# Patient Record
Sex: FEMALE | Race: WHITE | NOT HISPANIC OR LATINO | URBAN - METROPOLITAN AREA
[De-identification: names, ages, dates, MRNs, and addresses within clinical notes are randomized per-mention and may not be internally consistent; named-entity substitution may affect disease eponyms.]

---

## 2023-04-19 PROBLEM — E03.9 HYPOTHYROIDISM: Status: ACTIVE | Noted: 2023-04-19

## 2023-09-12 DIAGNOSIS — E78.5 HYPERLIPIDEMIA, UNSPECIFIED: ICD-10-CM

## 2023-09-12 RX ORDER — ROSUVASTATIN CALCIUM 40 MG/1
40 TABLET, COATED ORAL DAILY
Qty: 90 TABLET | Refills: 1 | Status: SHIPPED | OUTPATIENT
Start: 2023-09-12 | End: 2023-11-10 | Stop reason: SDUPTHER

## 2023-09-18 ENCOUNTER — OFFICE VISIT (OUTPATIENT)
Dept: PRIMARY CARE | Facility: CLINIC | Age: 63
End: 2023-09-18
Payer: COMMERCIAL

## 2023-09-18 VITALS
HEART RATE: 70 BPM | WEIGHT: 184 LBS | BODY MASS INDEX: 30.66 KG/M2 | TEMPERATURE: 98.6 F | HEIGHT: 65 IN | SYSTOLIC BLOOD PRESSURE: 122 MMHG | DIASTOLIC BLOOD PRESSURE: 68 MMHG | OXYGEN SATURATION: 96 %

## 2023-09-18 DIAGNOSIS — Z00.00 HEALTHCARE MAINTENANCE: ICD-10-CM

## 2023-09-18 DIAGNOSIS — M54.50 CHRONIC LEFT-SIDED LOW BACK PAIN WITHOUT SCIATICA: Primary | ICD-10-CM

## 2023-09-18 DIAGNOSIS — G89.29 CHRONIC LEFT-SIDED LOW BACK PAIN WITHOUT SCIATICA: Primary | ICD-10-CM

## 2023-09-18 PROCEDURE — 99213 OFFICE O/P EST LOW 20 MIN: CPT | Performed by: INTERNAL MEDICINE

## 2023-09-18 PROCEDURE — 90471 IMMUNIZATION ADMIN: CPT | Performed by: INTERNAL MEDICINE

## 2023-09-18 PROCEDURE — 90682 RIV4 VACC RECOMBINANT DNA IM: CPT | Performed by: INTERNAL MEDICINE

## 2023-09-18 NOTE — PROGRESS NOTES
"64 yo female here for the following    Back Pain     PHYSICAL October 14, 2022      Assessment/ plan:    low back pain:  xray, tylenol  Patient defers robaxin and physical therapy.     Other medical issues see below     PAD bilateral ICA stenosis: patient to take baby aspirin, follow up with dr mathur      HLD: stable, continue statin to 40mg crestor      near syncope or \"light handedness\": on going light headed. getting a CTA neck and abdomen soon. her Holter is wnl. gave order for echo 1/28/21 was wnl, will repeat her blood work. follow up with dr mathur  carotid ultrasound originally showed   right ICA 40-59%  left ICA 60-79 %      HOWEVER she then had CTA neck which showed NO stenosis of the left ICA and mild stenosis of the right      Continue on aspirin and statin      hypothyroid: no symptoms at this time.      hx melanoma in her left leg in the past seeing dermatology every 6 months      PATIENT says that she needs to get 3D Mammography as she has dense breast tissue     she is following with dr davies for colonoscopy on May 20, 2022     EKG is wnl 2022     mammogram 10/19/2022      following with GYN at [facility] Dr Senior     recommend shingles vaccine      colonoscopy screening June 2022 with follow up in 10 years.      Chief Complaint     Back Pain     History of Present Illness  female with hypertension, hypothyroidism comes for a        Lower Back Pain: Intermittently for 2 years but has worsened over the past few months and has become more consistent. Left sided dull/achey lower back pain radiating to buttock and groin.  Pain improves with movement throughout the day but worse if she goes to bend over/ pick something up. Some relief with tylenol in the evening. No truama or accident. No falls. Feels worsening with sitting upright.     Other medical issues     near syncope or \"lightheaded\": she has to still follow up with dr mathur . she is using aspirin     PAD: patient has a carotid ultras sound with " 40-59% on the CASSIE and 60-79 % left ICA, however, the CTA of neck in 2021 january that showed no stenosis of the left ICA and mild stenosis of the right side. she is following with dr mathur. she is on aspirin and statin therapy      hypothyroid: patient denies any hypo or hypothyroid symptoms. patient denies any palpitations, diarrhea or constipation     HLD: Patient denies any muscle aches and is tolerating statin therapy     social: patient denies any smoking, drug or alcohol abuse     Family history is significant for premature coronary disease. Father had bypass surgery in his 50s.       surgical hx: removal of melanoma     patient does go to gyn     denies illegal drugs and smoking history   occasional alcohol use     patient works as a paper manager.          Review of Systems  Constitutional: not feeling tired and  no headache  Cardiovascular: no exertional chest pain, no palpitations, no lower extremity edema and no intermittent leg claudication.   Lungs: Denies shortness of breath, exertional dyspnea, wheezing  Gastrointestinal: no change in bowel habits, no diarrhea, no nausea, no vomiting and no abdominal pain. Denies Melena, brbpr or dark stool  Musculoskeletal: no myalgias, no muscle weakness and no limb swelling. See hpi  Neurological: no headaches, no seizures, no numbness, no lateralizing deficits and no fainting.   Psychiatric: no depression and no anxiety.          Active Problems  Problems    · Allergic rhinitis due to pollen (477.0) (J30.1)   · Cellulitis of foot, left (682.7) (L03.116)   · Cough (786.2) (R05.9)   · Cracked skin (709.8) (R23.4)   · Encounter for immunization (V03.89) (Z23)   · Encounter for screening colonoscopy (V76.51) (Z12.11)   · Hospital discharge follow-up (V67.59) (Z09)   · Hyperlipidemia (272.4) (E78.5)   · Hypothyroidism (244.9) (E03.9)   · Added by Problem List Migration; 2013-12-13   · Lymphedema of left lower extremity (457.1) (I89.0)   · Malignant melanoma (172.9)  (C43.9)   · Near syncope (780.2) (R55)   · PAD (peripheral artery disease) (443.9) (I73.9)   · Pressure in chest (786.59) (R07.89)   · Skin lesion (709.9) (L98.9)   · Right scapular area. 1.5x1.5 cm slightly raised red irregularly bordered   · Vitamin D deficiency (268.9) (E55.9)     Past Medical History  Problems    · History of H/O colonoscopy (V45.89) (Z98.890)   01/15/10 dr choudhury     Social History  Problems    · Never smoker     Allergies  Medication    · No Known Drug Allergies   Recorded By: Mariya Lai; 7/2/2014 10:20:50 AM     Current Meds     Medication Name Instruction   Aspirin 81 MG Oral Tablet Delayed Release     Levothyroxine Sodium 100 MCG Oral Tablet TAKE 1 TABLET DAILY.   Restasis 0.05 % Ophthalmic Emulsion INSTILL 1 DROP IN BOTH EYES EVERY 12 HOURS DAILY.   Rosuvastatin Calcium 40 MG Oral Tablet TAKE 1 TABLET DAILY.   Triamcinolone Acetonide 0.1 % External Cream APPLY  AND RUB  IN A THIN FILM TO AFFECTED AREAS TWICE DAILY.(AM AND PM).   Vitamin D3 50 MCG (2000 UT) Oral Capsule TAKE 1 CAPSULE Daily      Vitals  Vital Signs     Recorded: [id number] 08:43AM   Heart Rate 86   Systolic 126   Diastolic 80   Height 5 ft 5 in   Weight 176 lb    BMI Calculated 29.29 kg/m2   BSA Calculated 1.87   Tobacco Use b) No   Falls Screening (Age 18+) a) No falls within the last year   O2 Saturation 97      Physical Exam  General appearance: Alert and in no acute distress. speech is clear and coherent  HEENT: Sclera and conjunctiva white, EOMI,    Respiratory : No respiratory distress, normal respiratory rhythm and effort. Clear bilateral breath sounds. No wheezing or rhonchi.   Cardiovascular: heart rate regular, S1, S2. no murmurs. no Lower extremity edema  MSK: 5/5 strength upper and lower extremities without gait abnormalities. no loss of muscle mass   Neuro: 2-12 CN grossly intact. no slurred speech. no lateralizing deficit  Psychiatric Orientation: Oriented to person, place, and time. no depression, homicidal  or suicidal thoughts, normal affect

## 2023-10-16 ENCOUNTER — OFFICE VISIT (OUTPATIENT)
Dept: PRIMARY CARE | Facility: CLINIC | Age: 63
End: 2023-10-16
Payer: COMMERCIAL

## 2023-10-16 ENCOUNTER — LAB (OUTPATIENT)
Dept: LAB | Facility: LAB | Age: 63
End: 2023-10-16
Payer: COMMERCIAL

## 2023-10-16 VITALS
DIASTOLIC BLOOD PRESSURE: 70 MMHG | HEIGHT: 65 IN | WEIGHT: 184 LBS | SYSTOLIC BLOOD PRESSURE: 112 MMHG | TEMPERATURE: 97.9 F | OXYGEN SATURATION: 97 % | HEART RATE: 68 BPM | BODY MASS INDEX: 30.66 KG/M2

## 2023-10-16 DIAGNOSIS — E78.5 HYPERLIPIDEMIA, UNSPECIFIED HYPERLIPIDEMIA TYPE: ICD-10-CM

## 2023-10-16 DIAGNOSIS — Z00.00 ANNUAL PHYSICAL EXAM: ICD-10-CM

## 2023-10-16 DIAGNOSIS — E03.9 HYPOTHYROIDISM, UNSPECIFIED TYPE: ICD-10-CM

## 2023-10-16 DIAGNOSIS — E55.9 VITAMIN D DEFICIENCY: ICD-10-CM

## 2023-10-16 DIAGNOSIS — I73.9 PAD (PERIPHERAL ARTERY DISEASE) (CMS-HCC): ICD-10-CM

## 2023-10-16 DIAGNOSIS — Z00.00 ANNUAL PHYSICAL EXAM: Primary | ICD-10-CM

## 2023-10-16 LAB
25(OH)D3 SERPL-MCNC: 32 NG/ML (ref 30–100)
ALBUMIN SERPL BCP-MCNC: 4.6 G/DL (ref 3.4–5)
ALP SERPL-CCNC: 71 U/L (ref 33–136)
ALT SERPL W P-5'-P-CCNC: 33 U/L (ref 7–45)
ANION GAP SERPL CALC-SCNC: 14 MMOL/L (ref 10–20)
AST SERPL W P-5'-P-CCNC: 24 U/L (ref 9–39)
BILIRUB SERPL-MCNC: 0.5 MG/DL (ref 0–1.2)
BUN SERPL-MCNC: 17 MG/DL (ref 6–23)
CALCIUM SERPL-MCNC: 9.9 MG/DL (ref 8.6–10.6)
CHLORIDE SERPL-SCNC: 107 MMOL/L (ref 98–107)
CHOLEST SERPL-MCNC: 167 MG/DL (ref 0–199)
CHOLESTEROL/HDL RATIO: 2.2
CO2 SERPL-SCNC: 26 MMOL/L (ref 21–32)
CREAT SERPL-MCNC: 0.63 MG/DL (ref 0.5–1.05)
ERYTHROCYTE [DISTWIDTH] IN BLOOD BY AUTOMATED COUNT: 11.9 % (ref 11.5–14.5)
EST. AVERAGE GLUCOSE BLD GHB EST-MCNC: 114 MG/DL
GFR SERPL CREATININE-BSD FRML MDRD: >90 ML/MIN/1.73M*2
GLUCOSE SERPL-MCNC: 93 MG/DL (ref 74–99)
HBA1C MFR BLD: 5.6 %
HCT VFR BLD AUTO: 41.7 % (ref 36–46)
HDLC SERPL-MCNC: 74.5 MG/DL
HGB BLD-MCNC: 13.5 G/DL (ref 12–16)
LDLC SERPL CALC-MCNC: 77 MG/DL
MCH RBC QN AUTO: 30.8 PG (ref 26–34)
MCHC RBC AUTO-ENTMCNC: 32.4 G/DL (ref 32–36)
MCV RBC AUTO: 95 FL (ref 80–100)
NON HDL CHOLESTEROL: 93 MG/DL (ref 0–149)
NRBC BLD-RTO: 0 /100 WBCS (ref 0–0)
PLATELET # BLD AUTO: 322 X10*3/UL (ref 150–450)
PMV BLD AUTO: 10.9 FL (ref 7.5–11.5)
POTASSIUM SERPL-SCNC: 4.8 MMOL/L (ref 3.5–5.3)
PROT SERPL-MCNC: 6.8 G/DL (ref 6.4–8.2)
RBC # BLD AUTO: 4.39 X10*6/UL (ref 4–5.2)
SODIUM SERPL-SCNC: 142 MMOL/L (ref 136–145)
TRIGL SERPL-MCNC: 76 MG/DL (ref 0–149)
TSH SERPL-ACNC: 1.3 MIU/L (ref 0.44–3.98)
VLDL: 15 MG/DL (ref 0–40)
WBC # BLD AUTO: 7.7 X10*3/UL (ref 4.4–11.3)

## 2023-10-16 PROCEDURE — 80061 LIPID PANEL: CPT

## 2023-10-16 PROCEDURE — 1036F TOBACCO NON-USER: CPT | Performed by: INTERNAL MEDICINE

## 2023-10-16 PROCEDURE — 36415 COLL VENOUS BLD VENIPUNCTURE: CPT

## 2023-10-16 PROCEDURE — 80053 COMPREHEN METABOLIC PANEL: CPT

## 2023-10-16 PROCEDURE — 84443 ASSAY THYROID STIM HORMONE: CPT

## 2023-10-16 PROCEDURE — 83036 HEMOGLOBIN GLYCOSYLATED A1C: CPT

## 2023-10-16 PROCEDURE — 85027 COMPLETE CBC AUTOMATED: CPT

## 2023-10-16 PROCEDURE — 93000 ELECTROCARDIOGRAM COMPLETE: CPT | Performed by: INTERNAL MEDICINE

## 2023-10-16 PROCEDURE — 99396 PREV VISIT EST AGE 40-64: CPT | Performed by: INTERNAL MEDICINE

## 2023-10-16 PROCEDURE — 82306 VITAMIN D 25 HYDROXY: CPT

## 2023-10-16 NOTE — PROGRESS NOTES
"64 yo female here for the following    PHYSICAL October 16, 2023      Assessment/ plan:    low back pain:  xray, tylenol  Patient defers robaxin and physical therapy.     Other medical issues see below     PAD bilateral ICA stenosis: patient to take baby aspirin, follow up with dr mathur      HLD: stable, continue statin to 40mg crestor      near syncope or \"light handedness\": on going light headed. getting a CTA neck and abdomen soon. her Holter is wnl. gave order for echo 1/28/21 was wnl, will repeat her blood work. follow up with dr mathur  carotid ultrasound originally showed   right ICA 40-59%  left ICA 60-79 %      HOWEVER she then had CTA neck which showed NO stenosis of the left ICA and mild stenosis of the right      Continue on aspirin and statin     Will re-order carotid      hypothyroid: no symptoms at this time.      hx melanoma in her left leg in the past seeing dermatology every 6 months      PATIENT says that she needs to get 3D Mammography as she has dense breast tissue     she is following with dr davies for colonoscopy on May 20, 2022     EKG is wnl 2023     mammogram 10/19/2022      following with GYN at [facility] Dr Senior     recommend shingles vaccine      colonoscopy screening June 2022 with follow up in 10 years.      Chief Complaint     Back Pain     History of Present Illness  female with hypertension, hypothyroidism comes for a        Lower Back Pain: Intermittently for 2 years but has worsened over the past few months and has become more consistent. Left sided dull/achey lower back pain radiating to buttock and groin.  Pain improves with movement throughout the day but worse if she goes to bend over/ pick something up. Some relief with tylenol in the evening. No truama or accident. No falls. Feels worsening with sitting upright.     Other medical issues     near syncope or \"lightheaded\": she has to still follow up with dr mathur . she is using aspirin     PAD: patient has a carotid ultras " sound with 40-59% on the CASSIE and 60-79 % left ICA, however, the CTA of neck in 2021 january that showed no stenosis of the left ICA and mild stenosis of the right side. she is following with dr mathur. she is on aspirin and statin therapy      hypothyroid: patient denies any hypo or hypothyroid symptoms. patient denies any palpitations, diarrhea or constipation     HLD: Patient denies any muscle aches and is tolerating statin therapy     social: patient denies any smoking, drug or alcohol abuse     Family history is significant for premature coronary disease. Father had bypass surgery in his 50s.       surgical hx: removal of melanoma     patient does go to gyn     denies illegal drugs and smoking history   occasional alcohol use     patient works as a paper manager.          Review of Systems  Constitutional: not feeling tired and  no headache  Cardiovascular: no exertional chest pain, no palpitations, no lower extremity edema and no intermittent leg claudication.   Lungs: Denies shortness of breath, exertional dyspnea, wheezing  Gastrointestinal: no change in bowel habits, no diarrhea, no nausea, no vomiting and no abdominal pain. Denies Melena, brbpr or dark stool  Musculoskeletal: no myalgias, no muscle weakness and no limb swelling. See hpi  Neurological: no headaches, no seizures, no numbness, no lateralizing deficits and no fainting.   Psychiatric: no depression and no anxiety.          Active Problems  Problems    · Allergic rhinitis due to pollen (477.0) (J30.1)   · Cellulitis of foot, left (682.7) (L03.116)   · Cough (786.2) (R05.9)   · Cracked skin (709.8) (R23.4)   · Encounter for immunization (V03.89) (Z23)   · Encounter for screening colonoscopy (V76.51) (Z12.11)   · Hospital discharge follow-up (V67.59) (Z09)   · Hyperlipidemia (272.4) (E78.5)   · Hypothyroidism (244.9) (E03.9)   · Added by Problem List Migration; 2013-12-13   · Lymphedema of left lower extremity (457.1) (I89.0)   · Malignant  melanoma (172.9) (C43.9)   · Near syncope (780.2) (R55)   · PAD (peripheral artery disease) (443.9) (I73.9)   · Pressure in chest (786.59) (R07.89)   · Skin lesion (709.9) (L98.9)   · Right scapular area. 1.5x1.5 cm slightly raised red irregularly bordered   · Vitamin D deficiency (268.9) (E55.9)     Past Medical History  Problems    · History of H/O colonoscopy (V45.89) (Z98.890)   01/15/10 dr choudhury     Social History  Problems    · Never smoker     Allergies  Medication    · No Known Drug Allergies   Recorded By: Mariya Lai; 7/2/2014 10:20:50 AM     Current Meds     Medication Name Instruction   Aspirin 81 MG Oral Tablet Delayed Release     Levothyroxine Sodium 100 MCG Oral Tablet TAKE 1 TABLET DAILY.   Restasis 0.05 % Ophthalmic Emulsion INSTILL 1 DROP IN BOTH EYES EVERY 12 HOURS DAILY.   Rosuvastatin Calcium 40 MG Oral Tablet TAKE 1 TABLET DAILY.   Triamcinolone Acetonide 0.1 % External Cream APPLY  AND RUB  IN A THIN FILM TO AFFECTED AREAS TWICE DAILY.(AM AND PM).   Vitamin D3 50 MCG (2000 UT) Oral Capsule TAKE 1 CAPSULE Daily      Vitals  Vital Signs     Recorded: [id number] 08:43AM   Heart Rate 86   Systolic 126   Diastolic 80   Height 5 ft 5 in   Weight 176 lb    BMI Calculated 29.29 kg/m2   BSA Calculated 1.87   Tobacco Use b) No   Falls Screening (Age 18+) a) No falls within the last year   O2 Saturation 97      Physical Exam  General appearance: Alert and in no acute distress. speech is clear and coherent  HEENT: Sclera and conjunctiva white, EOMI, uvela midline, no mouth lesions. PERRLA,  nasal turbinates are not swollen without exudate. TM's Falk with cone of light, external ear canal with scant cerumen. No head trauma  Neck: no carotid bruits or thyromegaly. no lymphadenopathy   Respiratory : No respiratory distress, normal respiratory rhythm and effort. Clear bilateral breath sounds. No wheezing or rhonchi.   Cardiovascular: heart rate regular, S1, S2. no murmurs. no Lower extremity edema  Skin  inspection: Normal skin color and pigmentation, normal skin turgor and no visible rash, induration, or cellulitis  MSK: 5/5 strength upper and lower extremities without gait abnormalities. no loss of muscle mass   Neuro: 2-12 CN grossly intact.  no slurred speech. no lateralizing deficit  Psychiatric Orientation: Oriented to person, place, and time. no depression, homicidal or suicidal thoughts, normal affect  Abdomen: soft, none tender, none distended. no organomegaly

## 2023-11-09 ENCOUNTER — TELEPHONE (OUTPATIENT)
Dept: PRIMARY CARE | Facility: CLINIC | Age: 63
End: 2023-11-09
Payer: COMMERCIAL

## 2023-11-09 NOTE — TELEPHONE ENCOUNTER
I returned this pt.'s call for lab result.   I informed her all where WNL including her cholesterol  She had been making some changes in diet and exercise.   She wanted to know if provider wants to decrease her crestor  I told her I would ask her provider and if he does it will be forwarded to the pharmacy  She verbalized understanding

## 2023-11-10 DIAGNOSIS — E78.5 HYPERLIPIDEMIA, UNSPECIFIED: ICD-10-CM

## 2023-11-10 RX ORDER — ROSUVASTATIN CALCIUM 20 MG/1
20 TABLET, COATED ORAL DAILY
Qty: 90 TABLET | Refills: 3 | Status: SHIPPED | OUTPATIENT
Start: 2023-11-10

## 2023-11-14 ENCOUNTER — TELEPHONE (OUTPATIENT)
Dept: PRIMARY CARE | Facility: CLINIC | Age: 63
End: 2023-11-14
Payer: COMMERCIAL

## 2023-11-14 NOTE — TELEPHONE ENCOUNTER
----- Message from Mk Sapp DO sent at 11/10/2023  8:39 AM EST -----  I did call her in a reduced dose of crestor to 20mg daily from 40mg daily. She can cut her current pills in half if she would like  ----- Message -----  From: Aliya Braxton LPN  Sent: 11/9/2023   8:55 AM EST  To: Mk Sapp, DO    Do you want to decrease this pt.'s Crestor?    See below telephone conversation    Thanks!    I returned this pt.'s call for lab result.   I informed her all where WNL including her cholesterol  She had been making some changes in diet and exercise.   She wanted to know if provider wants to decrease her crestor  I told her I would ask her provider and if he does it will be forwarded to the pharmacy  She verbalized understanding

## 2024-03-16 DIAGNOSIS — E03.9 HYPOTHYROIDISM, UNSPECIFIED: ICD-10-CM

## 2024-03-19 RX ORDER — LEVOTHYROXINE SODIUM 100 UG/1
TABLET ORAL
Qty: 90 TABLET | Refills: 3 | Status: SHIPPED | OUTPATIENT
Start: 2024-03-19

## 2024-08-08 ENCOUNTER — OFFICE VISIT (OUTPATIENT)
Dept: PRIMARY CARE | Facility: CLINIC | Age: 64
End: 2024-08-08
Payer: COMMERCIAL

## 2024-08-08 VITALS
HEART RATE: 92 BPM | BODY MASS INDEX: 26.66 KG/M2 | SYSTOLIC BLOOD PRESSURE: 128 MMHG | HEIGHT: 65 IN | WEIGHT: 160 LBS | DIASTOLIC BLOOD PRESSURE: 62 MMHG | OXYGEN SATURATION: 94 %

## 2024-08-08 DIAGNOSIS — R63.4 WEIGHT LOSS, ABNORMAL: Primary | ICD-10-CM

## 2024-08-08 LAB
NON-UH HIE A/G RATIO: 1.5
NON-UH HIE ALB: 4.1 G/DL (ref 3.4–5)
NON-UH HIE ALK PHOS: 123 UNIT/L (ref 45–117)
NON-UH HIE BILIRUBIN, TOTAL: 0.6 MG/DL (ref 0.3–1.2)
NON-UH HIE BUN/CREAT RATIO: 13.3
NON-UH HIE BUN: 12 MG/DL (ref 9–23)
NON-UH HIE CALCIUM: 9.6 MG/DL (ref 8.7–10.4)
NON-UH HIE CALCULATED OSMOLALITY: 293 MOSM/KG (ref 275–295)
NON-UH HIE CHLORIDE: 101 MMOL/L (ref 98–107)
NON-UH HIE CO2, VENOUS: 20 MMOL/L (ref 20–31)
NON-UH HIE CREATININE: 0.9 MG/DL (ref 0.5–0.8)
NON-UH HIE GFR AA: >60
NON-UH HIE GLOBULIN: 2.8 G/DL
NON-UH HIE GLOMERULAR FILTRATION RATE: >60 ML/MIN/1.73M?
NON-UH HIE GLUCOSE: 484 MG/DL (ref 74–106)
NON-UH HIE GOT: 19 UNIT/L (ref 15–37)
NON-UH HIE GPT: 27 UNIT/L (ref 10–49)
NON-UH HIE HCT: 42.9 % (ref 36–46)
NON-UH HIE HGB A1C: 12.4 %
NON-UH HIE HGB: 14.5 G/DL (ref 12–16)
NON-UH HIE INSTR WBC ND: 9.7
NON-UH HIE K: 3.9 MMOL/L (ref 3.5–5.1)
NON-UH HIE MCH: 31.1 PG (ref 27–34)
NON-UH HIE MCHC: 33.8 G/DL (ref 32–37)
NON-UH HIE MCV: 92 FL (ref 80–100)
NON-UH HIE MPV: 10.1 FL (ref 7.4–10.4)
NON-UH HIE NA: 136 MMOL/L (ref 135–145)
NON-UH HIE PLATELET: 291 X10 (ref 150–450)
NON-UH HIE RBC: 4.66 X10 (ref 4.2–5.4)
NON-UH HIE RDW: 13.1 % (ref 11.5–14.5)
NON-UH HIE TOTAL PROTEIN: 6.9 G/DL (ref 5.7–8.2)
NON-UH HIE TSH: 2.42 UIU/ML (ref 0.55–4.78)
NON-UH HIE WBC: 9.7 X10 (ref 4.5–11)

## 2024-08-08 PROCEDURE — 99214 OFFICE O/P EST MOD 30 MIN: CPT | Performed by: INTERNAL MEDICINE

## 2024-08-08 PROCEDURE — 3008F BODY MASS INDEX DOCD: CPT | Performed by: INTERNAL MEDICINE

## 2024-08-08 PROCEDURE — 1036F TOBACCO NON-USER: CPT | Performed by: INTERNAL MEDICINE

## 2024-08-08 RX ORDER — CYCLOSPORINE 0.5 MG/ML
1 EMULSION OPHTHALMIC 2 TIMES DAILY
COMMUNITY

## 2024-08-08 RX ORDER — NAPROXEN SODIUM 220 MG/1
81 TABLET, FILM COATED ORAL
COMMUNITY

## 2024-08-08 NOTE — PROGRESS NOTES
"64 yo female here for the following    PHYSICAL October 16, 2023      Assessment/ plan:    abnormal weight loss 180s to 160s lbs in 1 month with temporal wasting on exam  CT chest abdomen and pelvis with IV contrast  Cbc cmp tsh   Mammogram October 2024 wnl  Colonoscopy up to date  Patient follows with GYN regularly for pelvic exams    Other medical issues see below     hx melanoma in her left leg in the past seeing dermatology every 6 months     PAD bilateral ICA stenosis: patient to take baby aspirin, follow up with dr mathur      HLD: stable, continue statin to 40mg crestor      near syncope or \"light handedness\": on going light headed. getting a CTA neck and abdomen soon. her Holter is wnl. gave order for echo 1/28/21 was wnl, will repeat her blood work. follow up with dr mathur  carotid ultrasound originally showed   right ICA 40-59%  left ICA 60-79 %      HOWEVER she then had CTA neck which showed NO stenosis of the left ICA and mild stenosis of the right      Continue on aspirin and statin         hypothyroid: no symptoms at this time.         PATIENT says that she needs to get 3D Mammography as she has dense breast tissue         EKG is wnl 2023         following with GYN at [facility] Dr Senior     recommend shingles vaccine      Dr Martin colonoscopy screening June 2022 with follow up in 10 years.      Chief Complaint     Back Pain     History of Present Illness  female with hypertension, hypothyroidism comes for a      Patient is losing weight. She is fatigued. She says she cannot get enough water and she is drinking water all the time. Bruising easily too.     Lower Back Pain: Intermittently for 2 years but has worsened over the past few months and has become more consistent. Left sided dull/achey lower back pain radiating to buttock and groin.  Pain improves with movement throughout the day but worse if she goes to bend over/ pick something up. Some relief with tylenol in the evening. No truama or " "accident. No falls. Feels worsening with sitting upright.     Other medical issues     near syncope or \"lightheaded\": she has to still follow up with dr mathur . she is using aspirin     PAD: patient has a carotid ultras sound with 40-59% on the CASSIE and 60-79 % left ICA, however, the CTA of neck in 2021 january that showed no stenosis of the left ICA and mild stenosis of the right side. she is following with dr mathur. she is on aspirin and statin therapy      hypothyroid: patient denies any hypo or hypothyroid symptoms. patient denies any palpitations, diarrhea or constipation     HLD: Patient denies any muscle aches and is tolerating statin therapy     social: patient denies any smoking, drug or alcohol abuse     Family history is significant for premature coronary disease. Father had bypass surgery in his 50s.       surgical hx: removal of melanoma     patient does go to gyn     denies illegal drugs and smoking history   occasional alcohol use     patient works as a paper manager.          Review of Systems  Constitutional: not feeling tired and  no headache  Cardiovascular: no exertional chest pain, no palpitations, no lower extremity edema and no intermittent leg claudication.   Lungs: Denies shortness of breath, exertional dyspnea, wheezing  Gastrointestinal: no change in bowel habits, no diarrhea, no nausea, no vomiting and no abdominal pain. Denies Melena, brbpr or dark stool  Musculoskeletal: no myalgias, no muscle weakness and no limb swelling. See hpi  Neurological: no headaches, no seizures, no numbness, no lateralizing deficits and no fainting.   Psychiatric: no depression and no anxiety.          Active Problems  Problems    · Allergic rhinitis due to pollen (477.0) (J30.1)   · Cellulitis of foot, left (682.7) (L03.116)   · Cough (786.2) (R05.9)   · Cracked skin (709.8) (R23.4)   · Encounter for immunization (V03.89) (Z23)   · Encounter for screening colonoscopy (V76.51) (Z12.11)   · Hospital " discharge follow-up (V67.59) (Z09)   · Hyperlipidemia (272.4) (E78.5)   · Hypothyroidism (244.9) (E03.9)   · Added by Problem List Migration; 2013-12-13   · Lymphedema of left lower extremity (457.1) (I89.0)   · Malignant melanoma (172.9) (C43.9)   · Near syncope (780.2) (R55)   · PAD (peripheral artery disease) (443.9) (I73.9)   · Pressure in chest (786.59) (R07.89)   · Skin lesion (709.9) (L98.9)   · Right scapular area. 1.5x1.5 cm slightly raised red irregularly bordered   · Vitamin D deficiency (268.9) (E55.9)     Past Medical History  Problems    · History of H/O colonoscopy (V45.89) (Z98.890)   01/15/10 dr choudhury     Social History  Problems    · Never smoker     Allergies  Medication    · No Known Drug Allergies   Recorded By: Mariya Lai; 7/2/2014 10:20:50 AM     Current Meds     Medication Name Instruction   Aspirin 81 MG Oral Tablet Delayed Release     Levothyroxine Sodium 100 MCG Oral Tablet TAKE 1 TABLET DAILY.   Restasis 0.05 % Ophthalmic Emulsion INSTILL 1 DROP IN BOTH EYES EVERY 12 HOURS DAILY.   Rosuvastatin Calcium 40 MG Oral Tablet TAKE 1 TABLET DAILY.   Triamcinolone Acetonide 0.1 % External Cream APPLY  AND RUB  IN A THIN FILM TO AFFECTED AREAS TWICE DAILY.(AM AND PM).   Vitamin D3 50 MCG (2000 UT) Oral Capsule TAKE 1 CAPSULE Daily      Vitals  Vital Signs     Recorded: [id number] 08:43AM   Heart Rate 86   Systolic 126   Diastolic 80   Height 5 ft 5 in   Weight 176 lb    BMI Calculated 29.29 kg/m2   BSA Calculated 1.87   Tobacco Use b) No   Falls Screening (Age 18+) a) No falls within the last year   O2 Saturation 97      Physical Exam  General appearance: Alert and in no acute distress. speech is clear and coherent  HEENT: Sclera and conjunctiva white, EOMI, uvela midline, no mouth lesions. PERRLA,  nasal turbinates are not swollen without exudate. TM's Falk with cone of light, external ear canal with scant cerumen. No head trauma. Temproal wasting noted.   Neck: no carotid bruits or  thyromegaly. no lymphadenopathy   Respiratory : No respiratory distress, normal respiratory rhythm and effort. Clear bilateral breath sounds. No wheezing or rhonchi.   Cardiovascular: heart rate regular, S1, S2. no murmurs. no Lower extremity edema  Skin inspection: Normal skin color and pigmentation, normal skin turgor and no visible rash, induration, or cellulitis  MSK: 5/5 strength upper and lower extremities without gait abnormalities. no loss of muscle mass   Neuro: 2-12 CN grossly intact.  no slurred speech. no lateralizing deficit  Psychiatric Orientation: Oriented to person, place, and time. no depression, homicidal or suicidal thoughts, normal affect  Abdomen: soft, none tender, none distended. no organomegaly

## 2024-08-09 ENCOUNTER — OFFICE VISIT (OUTPATIENT)
Dept: PRIMARY CARE | Facility: CLINIC | Age: 64
End: 2024-08-09
Payer: COMMERCIAL

## 2024-08-09 VITALS
HEART RATE: 74 BPM | DIASTOLIC BLOOD PRESSURE: 74 MMHG | HEIGHT: 65 IN | OXYGEN SATURATION: 95 % | BODY MASS INDEX: 26.12 KG/M2 | WEIGHT: 156.8 LBS | SYSTOLIC BLOOD PRESSURE: 133 MMHG

## 2024-08-09 DIAGNOSIS — E03.9 HYPOTHYROIDISM, UNSPECIFIED TYPE: ICD-10-CM

## 2024-08-09 DIAGNOSIS — E13.69 OTHER SPECIFIED DIABETES MELLITUS WITH OTHER SPECIFIED COMPLICATION, WITHOUT LONG-TERM CURRENT USE OF INSULIN (MULTI): Primary | ICD-10-CM

## 2024-08-09 DIAGNOSIS — E13.69 OTHER SPECIFIED DIABETES MELLITUS WITH OTHER SPECIFIED COMPLICATION, UNSPECIFIED WHETHER LONG TERM INSULIN USE (MULTI): ICD-10-CM

## 2024-08-09 DIAGNOSIS — I73.9 PAD (PERIPHERAL ARTERY DISEASE) (CMS-HCC): ICD-10-CM

## 2024-08-09 DIAGNOSIS — E78.5 HYPERLIPIDEMIA, UNSPECIFIED HYPERLIPIDEMIA TYPE: ICD-10-CM

## 2024-08-09 PROCEDURE — 3008F BODY MASS INDEX DOCD: CPT | Performed by: EMERGENCY MEDICINE

## 2024-08-09 PROCEDURE — 3078F DIAST BP <80 MM HG: CPT | Performed by: EMERGENCY MEDICINE

## 2024-08-09 PROCEDURE — 99214 OFFICE O/P EST MOD 30 MIN: CPT | Performed by: EMERGENCY MEDICINE

## 2024-08-09 PROCEDURE — 1036F TOBACCO NON-USER: CPT | Performed by: EMERGENCY MEDICINE

## 2024-08-09 PROCEDURE — 3075F SYST BP GE 130 - 139MM HG: CPT | Performed by: EMERGENCY MEDICINE

## 2024-08-09 RX ORDER — METFORMIN HYDROCHLORIDE 500 MG/1
500 TABLET ORAL
Qty: 180 TABLET | Refills: 1 | Status: SHIPPED | OUTPATIENT
Start: 2024-08-09 | End: 2025-02-05

## 2024-08-09 RX ORDER — DEXTROSE 4 G
TABLET,CHEWABLE ORAL
Qty: 3 EACH | Refills: 1 | Status: SHIPPED | OUTPATIENT
Start: 2024-08-09

## 2024-08-09 RX ORDER — LANCETS
EACH MISCELLANEOUS
Qty: 100 EACH | Refills: 3 | Status: SHIPPED | OUTPATIENT
Start: 2024-08-09

## 2024-08-09 RX ORDER — BLOOD SUGAR DIAGNOSTIC
STRIP MISCELLANEOUS
Qty: 100 EACH | Refills: 1 | Status: SHIPPED | OUTPATIENT
Start: 2024-08-09

## 2024-08-09 NOTE — PROGRESS NOTES
"64 yo female here for office visit    PHYSICAL October 16, 2023      Assessment/ plan:    Diabetes -  Patient has has a lack of appetite, abnormal weight loss, dry mouth, and excessive thirst and urination. Symptoms included light headedness and some bruising. She likely has diabetes as sugar was 484 and A1c was 12. Patient was counseled on the importance of maintaining a healthy diet and exercise. We will prescribe metformin and monitor, is situation does not improve we will increase the dose and update treatment plan. Patient will check glucose at home, we can consider continuous glucose monitor.      Other medical issues see below     hx melanoma in her left leg in the past seeing dermatology every 6 months     PAD bilateral ICA stenosis: patient to take baby aspirin, follow up with dr mathur      HLD: stable, continue statin to 40mg crestor      near syncope or \"light handedness\": on going light headed. getting a CTA neck and abdomen soon. her Holter is wnl. gave order for echo 1/28/21 was wnl, will repeat her blood work. follow up with dr mathur  carotid ultrasound originally showed   right ICA 40-59%  left ICA 60-79 %      HOWEVER she then had CTA neck which showed NO stenosis of the left ICA and mild stenosis of the right      Continue on aspirin and statin         hypothyroid: no symptoms at this time.       Preventative care-  PATIENT says that she needs to get 3D Mammography as she has dense breast tissue  EKG is wnl 2023  following with GYN at [facility] Dr Senior  recommend shingles vaccine   Dr Martin colonoscopy screening June 2022 with follow up in 10 years.     Schedule a follow up in september     Chief Complaint     Back Pain     History of Present Illness  female with hypertension, hypothyroidism comes for a          Lower Back Pain: Intermittently for 2 years but has worsened over the past few months and has become more consistent. Left sided dull/achey lower back pain radiating to buttock and " "groin.  Pain improves with movement throughout the day but worse if she goes to bend over/ pick something up. Some relief with tylenol in the evening. No truama or accident. No falls. Feels worsening with sitting upright.     Other medical issues     near syncope or \"lightheaded\": she has to still follow up with dr mathur . she is using aspirin     PAD: patient has a carotid ultras sound with 40-59% on the CASSIE and 60-79 % left ICA, however, the CTA of neck in 2021 january that showed no stenosis of the left ICA and mild stenosis of the right side. she is following with dr mathur. she is on aspirin and statin therapy      hypothyroid: patient denies any hypo or hypothyroid symptoms. patient denies any palpitations, diarrhea or constipation     HLD: Patient denies any muscle aches and is tolerating statin therapy     social: patient denies any smoking, drug or alcohol abuse     Family history is significant for premature coronary disease. Father had bypass surgery in his 50s.       surgical hx: removal of melanoma     patient does go to gyn     denies illegal drugs and smoking history   occasional alcohol use     patient works as a paper manager.          Review of Systems  Constitutional: not feeling tired and  no headache  Cardiovascular: no exertional chest pain, no palpitations, no lower extremity edema and no intermittent leg claudication.   Lungs: Denies shortness of breath, exertional dyspnea, wheezing  Gastrointestinal: no change in bowel habits, no diarrhea, no nausea, no vomiting and no abdominal pain. Denies Melena, brbpr or dark stool  Musculoskeletal: no myalgias, no muscle weakness and no limb swelling. See hpi  Neurological: no headaches, no seizures, no numbness, no lateralizing deficits and no fainting.   Psychiatric: no depression and no anxiety.          Active Problems  Problems    · Allergic rhinitis due to pollen (477.0) (J30.1)   · Cellulitis of foot, left (682.7) (L03.116)   · Cough (786.2) " (R05.9)   · Cracked skin (709.8) (R23.4)   · Encounter for immunization (V03.89) (Z23)   · Encounter for screening colonoscopy (V76.51) (Z12.11)   · Hospital discharge follow-up (V67.59) (Z09)   · Hyperlipidemia (272.4) (E78.5)   · Hypothyroidism (244.9) (E03.9)   · Added by Problem List Migration; 2013-12-13   · Lymphedema of left lower extremity (457.1) (I89.0)   · Malignant melanoma (172.9) (C43.9)   · Near syncope (780.2) (R55)   · PAD (peripheral artery disease) (443.9) (I73.9)   · Pressure in chest (786.59) (R07.89)   · Skin lesion (709.9) (L98.9)   · Right scapular area. 1.5x1.5 cm slightly raised red irregularly bordered   · Vitamin D deficiency (268.9) (E55.9)     Past Medical History  Problems    · History of H/O colonoscopy (V45.89) (Z98.890)   01/15/10 dr choudhury     Social History  Problems    · Never smoker     Allergies  Medication    · No Known Drug Allergies   Recorded By: Mariya Lai; 7/2/2014 10:20:50 AM     Current Meds     Medication Name Instruction   Aspirin 81 MG Oral Tablet Delayed Release     Levothyroxine Sodium 100 MCG Oral Tablet TAKE 1 TABLET DAILY.   Restasis 0.05 % Ophthalmic Emulsion INSTILL 1 DROP IN BOTH EYES EVERY 12 HOURS DAILY.   Rosuvastatin Calcium 40 MG Oral Tablet TAKE 1 TABLET DAILY.   Triamcinolone Acetonide 0.1 % External Cream APPLY  AND RUB  IN A THIN FILM TO AFFECTED AREAS TWICE DAILY.(AM AND PM).   Vitamin D3 50 MCG (2000 UT) Oral Capsule TAKE 1 CAPSULE Daily      Vitals  Vital Signs     Recorded: [id number] 08:43AM   Heart Rate 86   Systolic 126   Diastolic 80   Height 5 ft 5 in   Weight 176 lb    BMI Calculated 29.29 kg/m2   BSA Calculated 1.87   Tobacco Use b) No   Falls Screening (Age 18+) a) No falls within the last year   O2 Saturation 97      Physical Exam  General appearance: Alert and in no acute distress. speech is clear and coherent  HEENT: Sclera and conjunctiva white, EOMI, uvela midline, no mouth lesions. PERRLA,  nasal turbinates are not swollen without  exudate. TM's Grey with cone of light, external ear canal with scant cerumen. No head trauma. Temproal wasting noted.   Neck: no carotid bruits or thyromegaly. no lymphadenopathy   Respiratory : No respiratory distress, normal respiratory rhythm and effort. Clear bilateral breath sounds. No wheezing or rhonchi.   Cardiovascular: heart rate regular, S1, S2. no murmurs. no Lower extremity edema  Skin inspection: Normal skin color and pigmentation, normal skin turgor and no visible rash, induration, or cellulitis  MSK: 5/5 strength upper and lower extremities without gait abnormalities. no loss of muscle mass   Neuro: 2-12 CN grossly intact.  no slurred speech. no lateralizing deficit  Psychiatric Orientation: Oriented to person, place, and time. no depression, homicidal or suicidal thoughts, normal affect  Abdomen: soft, none tender, none distended. no organomegaly

## 2024-08-14 ENCOUNTER — APPOINTMENT (OUTPATIENT)
Dept: PRIMARY CARE | Facility: CLINIC | Age: 64
End: 2024-08-14
Payer: COMMERCIAL

## 2024-08-14 DIAGNOSIS — R63.4 WEIGHT LOSS, ABNORMAL: ICD-10-CM

## 2024-08-14 DIAGNOSIS — E11.9 TYPE 2 DIABETES MELLITUS WITHOUT COMPLICATION, WITHOUT LONG-TERM CURRENT USE OF INSULIN (MULTI): Primary | ICD-10-CM

## 2024-08-14 DIAGNOSIS — R74.8 ELEVATED ALKALINE PHOSPHATASE LEVEL: ICD-10-CM

## 2024-08-14 PROCEDURE — 99443 PR PHYS/QHP TELEPHONE EVALUATION 21-30 MIN: CPT | Performed by: INTERNAL MEDICINE

## 2024-08-14 RX ORDER — GLIPIZIDE 2.5 MG/1
2.5 TABLET, EXTENDED RELEASE ORAL DAILY
Qty: 30 TABLET | Refills: 11 | Status: SHIPPED | OUTPATIENT
Start: 2024-08-14 | End: 2025-08-14

## 2024-08-14 NOTE — PROGRESS NOTES
"62 yo female here for the following    Telephone appointment     PHYSICAL October 16, 2023      Assessment/ plan:   NEW DIABETES type 2:   She has a glucose monitor 200s in am and the 400s post prandial   A1C 12  Started on metformin 500mg bid   Glipizide ER 2.5mg daily   ARSEN ab     Elevated alkaline  elevated abnormal weight loss 180s to 160s lbs in 1 month with temporal wasting on exam  Left lower back   CT chest abdomen and pelvis with IV contrast  Cbc cmp tsh is wnl  Mammogram October 2024 wnl  Colonoscopy up to date  Patient follows with GYN regularly for pelvic exams    Other medical issues see below     hx melanoma in her left leg in the past seeing dermatology every 6 months     PAD bilateral ICA stenosis: patient to take baby aspirin, follow up with dr mathur      HLD: stable, continue statin to 40mg crestor      near syncope or \"light handedness\": on going light headed. getting a CTA neck and abdomen soon. her Holter is wnl. gave order for echo 1/28/21 was wnl, will repeat her blood work. follow up with dr mathur  carotid ultrasound originally showed   right ICA 40-59%  left ICA 60-79 %      HOWEVER she then had CTA neck which showed NO stenosis of the left ICA and mild stenosis of the right      Continue on aspirin and statin         hypothyroid: no symptoms at this time.         PATIENT says that she needs to get 3D Mammography as she has dense breast tissue         EKG is wnl 2023         following with GYN at [facility] Dr Senior     recommend shingles vaccine      Dr Martin colonoscopy screening June 2022 with follow up in 10 years.      Chief Complaint     Back Pain     History of Present Illness  female with hypertension, hypothyroidism comes for a      Patient is losing weight. She is fatigued. She says she cannot get enough water and she is drinking water all the time. Bruising easily too. Patients now found to have A1c 12. Her a1cs were always wnl.     Lower Back Pain: Intermittently for 2 years " "but has worsened over the past few months and has become more consistent. Left sided dull/achey lower back pain radiating to buttock and groin.  Pain improves with movement throughout the day but worse if she goes to bend over/ pick something up. Some relief with tylenol in the evening. No truama or accident. No falls. Feels worsening with sitting upright.     Other medical issues     near syncope or \"lightheaded\": she has to still follow up with dr mathur . she is using aspirin     PAD: patient has a carotid ultras sound with 40-59% on the CASSIE and 60-79 % left ICA, however, the CTA of neck in 2021 january that showed no stenosis of the left ICA and mild stenosis of the right side. she is following with dr mathur. she is on aspirin and statin therapy      hypothyroid: patient denies any hypo or hypothyroid symptoms. patient denies any palpitations, diarrhea or constipation     HLD: Patient denies any muscle aches and is tolerating statin therapy     social: patient denies any smoking, drug or alcohol abuse     Family history is significant for premature coronary disease. Father had bypass surgery in his 50s.       surgical hx: removal of melanoma     patient does go to gyn     denies illegal drugs and smoking history   occasional alcohol use     patient works as a paper manager.          Review of Systems           Active Problems  Problems    · Allergic rhinitis due to pollen (477.0) (J30.1)   · Cellulitis of foot, left (682.7) (L03.116)   · Cough (786.2) (R05.9)   · Cracked skin (709.8) (R23.4)   · Encounter for immunization (V03.89) (Z23)   · Encounter for screening colonoscopy (V76.51) (Z12.11)   · Hospital discharge follow-up (V67.59) (Z09)   · Hyperlipidemia (272.4) (E78.5)   · Hypothyroidism (244.9) (E03.9)   · Added by Problem List Migration; 2013-12-13   · Lymphedema of left lower extremity (457.1) (I89.0)   · Malignant melanoma (172.9) (C43.9)   · Near syncope (780.2) (R55)   · PAD (peripheral artery " disease) (443.9) (I73.9)   · Pressure in chest (786.59) (R07.89)   · Skin lesion (709.9) (L98.9)   · Right scapular area. 1.5x1.5 cm slightly raised red irregularly bordered   · Vitamin D deficiency (268.9) (E55.9)     Past Medical History  Problems    · History of H/O colonoscopy (V45.89) (Z98.890)   01/15/10 dr choudhury     Social History  Problems    · Never smoker     Allergies  Medication    · No Known Drug Allergies   Recorded By: Mariya Lai; 7/2/2014 10:20:50 AM     Current Meds     Medication Name Instruction   Aspirin 81 MG Oral Tablet Delayed Release     Levothyroxine Sodium 100 MCG Oral Tablet TAKE 1 TABLET DAILY.   Restasis 0.05 % Ophthalmic Emulsion INSTILL 1 DROP IN BOTH EYES EVERY 12 HOURS DAILY.   Rosuvastatin Calcium 40 MG Oral Tablet TAKE 1 TABLET DAILY.   Triamcinolone Acetonide 0.1 % External Cream APPLY  AND RUB  IN A THIN FILM TO AFFECTED AREAS TWICE DAILY.(AM AND PM).   Vitamin D3 50 MCG (2000 UT) Oral Capsule TAKE 1 CAPSULE Daily      Vitals  Vital Signs     Recorded: [id number] 08:43AM   Heart Rate 86   Systolic 126   Diastolic 80   Height 5 ft 5 in   Weight 176 lb    BMI Calculated 29.29 kg/m2   BSA Calculated 1.87   Tobacco Use b) No   Falls Screening (Age 18+) a) No falls within the last year   O2 Saturation 97      Physical Exam

## 2024-08-19 ENCOUNTER — TELEPHONE (OUTPATIENT)
Dept: PRIMARY CARE | Facility: CLINIC | Age: 64
End: 2024-08-19
Payer: COMMERCIAL

## 2024-08-19 NOTE — TELEPHONE ENCOUNTER
Pt states they had a CT scan and glucose test done recently, and they were advised to schedule a virtual once they had those results. When should I schedule her?

## 2024-08-22 NOTE — TELEPHONE ENCOUNTER
----- Message from Mk Sapp sent at 8/21/2024 11:36 AM EDT -----  No acute findings on ct  There are lung nodules that we will follow up in 1 year on   You have prominent ovarian veins identified left greater then right, recommend referral to GYN to discuss the finding.

## 2024-08-23 ENCOUNTER — TELEMEDICINE (OUTPATIENT)
Dept: PRIMARY CARE | Facility: CLINIC | Age: 64
End: 2024-08-23
Payer: COMMERCIAL

## 2024-08-23 ENCOUNTER — APPOINTMENT (OUTPATIENT)
Dept: PRIMARY CARE | Facility: CLINIC | Age: 64
End: 2024-08-23
Payer: COMMERCIAL

## 2024-08-23 DIAGNOSIS — R91.1 LUNG NODULE: Primary | ICD-10-CM

## 2024-08-23 DIAGNOSIS — E13.69 OTHER SPECIFIED DIABETES MELLITUS WITH OTHER SPECIFIED COMPLICATION, UNSPECIFIED WHETHER LONG TERM INSULIN USE (MULTI): ICD-10-CM

## 2024-08-23 DIAGNOSIS — E11.9 TYPE 2 DIABETES MELLITUS WITHOUT COMPLICATION, WITHOUT LONG-TERM CURRENT USE OF INSULIN (MULTI): ICD-10-CM

## 2024-08-23 PROCEDURE — 99442 PR PHYS/QHP TELEPHONE EVALUATION 11-20 MIN: CPT | Performed by: INTERNAL MEDICINE

## 2024-08-23 RX ORDER — METFORMIN HYDROCHLORIDE 1000 MG/1
1000 TABLET ORAL
Qty: 180 TABLET | Refills: 3 | Status: SHIPPED | OUTPATIENT
Start: 2024-08-23 | End: 2025-08-18

## 2024-08-23 RX ORDER — GLIPIZIDE 5 MG/1
5 TABLET, FILM COATED, EXTENDED RELEASE ORAL DAILY
Qty: 90 TABLET | Refills: 3 | Status: SHIPPED | OUTPATIENT
Start: 2024-08-23 | End: 2025-08-23

## 2024-08-23 NOTE — PROGRESS NOTES
"64 yo female here for the following    Telephone appointment for diabetes     PHYSICAL October 16, 2023      Assessment/ plan:   NEW DIABETES type 2:   She has a glucose monitor 200s in am and the 400s post prandial   A1C 12 (8/8/24)  Blood sugar log reviewed shows sugars in the  morning sugars 200s in the -400s  Started on metformin 500mg bid 8/8/24 --> 1000mg bid 8/23/24  Glipizide ER 2.5mg daily  8/8/24 --> Glipizide 5mg ER 8/23/24  ARSEN ab , please get blood work done at your earliest convenience     Follow up in 2 weeks     Lung nodule follow up August 2025     Elevated alkaline  elevated abnormal weight loss 180s to 160s lbs in 1 month with temporal wasting on exam  Left lower back     8/2024 CT chest abdomen and pelvis with IV contrast No acute findings on ct  There are lung nodules that we will follow up in 1 year on  You have prominent ovarian veins identified left greater then right, recommend referral to GYN to discuss the finding.     Mammogram October 2024 wnl  Colonoscopy up to date  Patient follows with GYN regularly for pelvic exams    Other medical issues see below     hx melanoma in her left leg in the past seeing dermatology every 6 months     PAD bilateral ICA stenosis: patient to take baby aspirin, follow up with dr mathur      HLD: stable, continue statin to 40mg crestor      near syncope or \"light handedness\": on going light headed. getting a CTA neck and abdomen soon. her Holter is wnl. gave order for echo 1/28/21 was wnl, will repeat her blood work. follow up with dr mathur  carotid ultrasound originally showed   right ICA 40-59%  left ICA 60-79 %      HOWEVER she then had CTA neck which showed NO stenosis of the left ICA and mild stenosis of the right      Continue on aspirin and statin         hypothyroid: no symptoms at this time.         PATIENT says that she needs to get 3D Mammography as she has dense breast tissue         EKG is wnl 2023         following with GYN at [facility] Dr" "Parrish     recommend shingles vaccine      Dr Martin colonoscopy screening June 2022 with follow up in 10 years.      Chief Complaint     Back Pain     History of Present Illness  female with hypertension, hypothyroidism comes for a      Patient is losing weight. She is fatigued. She says she cannot get enough water and she is drinking water all the time. Bruising easily too. Patients now found to have A1c 12. Her a1cs were always wnl. Patient has given me 10 days of her blood sugars with morning fasting sugars in the 200s and lung and dinner mostly in the 300-400 range.     Lower Back Pain: Intermittently for 2 years but has worsened over the past few months and has become more consistent. Left sided dull/achey lower back pain radiating to buttock and groin.  Pain improves with movement throughout the day but worse if she goes to bend over/ pick something up. Some relief with tylenol in the evening. No truama or accident. No falls. Feels worsening with sitting upright.     Other medical issues     near syncope or \"lightheaded\": she has to still follow up with dr mathur . she is using aspirin     PAD: patient has a carotid ultras sound with 40-59% on the CASSIE and 60-79 % left ICA, however, the CTA of neck in 2021 january that showed no stenosis of the left ICA and mild stenosis of the right side. she is following with dr mathur. she is on aspirin and statin therapy      hypothyroid: patient denies any hypo or hypothyroid symptoms. patient denies any palpitations, diarrhea or constipation     HLD: Patient denies any muscle aches and is tolerating statin therapy     social: patient denies any smoking, drug or alcohol abuse     Family history is significant for premature coronary disease. Father had bypass surgery in his 50s.       surgical hx: removal of melanoma     patient does go to gyn     denies illegal drugs and smoking history   occasional alcohol use     patient works as a paper manager.          Review of " Systems           Active Problems  Problems    · Allergic rhinitis due to pollen (477.0) (J30.1)   · Cellulitis of foot, left (682.7) (L03.116)   · Cough (786.2) (R05.9)   · Cracked skin (709.8) (R23.4)   · Encounter for immunization (V03.89) (Z23)   · Encounter for screening colonoscopy (V76.51) (Z12.11)   · Hospital discharge follow-up (V67.59) (Z09)   · Hyperlipidemia (272.4) (E78.5)   · Hypothyroidism (244.9) (E03.9)   · Added by Problem List Migration; 2013-12-13   · Lymphedema of left lower extremity (457.1) (I89.0)   · Malignant melanoma (172.9) (C43.9)   · Near syncope (780.2) (R55)   · PAD (peripheral artery disease) (443.9) (I73.9)   · Pressure in chest (786.59) (R07.89)   · Skin lesion (709.9) (L98.9)   · Right scapular area. 1.5x1.5 cm slightly raised red irregularly bordered   · Vitamin D deficiency (268.9) (E55.9)     Past Medical History  Problems    · History of H/O colonoscopy (V45.89) (Z98.890)   01/15/10 dr choudhury     Social History  Problems    · Never smoker     Allergies  Medication    · No Known Drug Allergies   Recorded By: Mariya Lai; 7/2/2014 10:20:50 AM     Current Meds     Medication Name Instruction   Aspirin 81 MG Oral Tablet Delayed Release     Levothyroxine Sodium 100 MCG Oral Tablet TAKE 1 TABLET DAILY.   Restasis 0.05 % Ophthalmic Emulsion INSTILL 1 DROP IN BOTH EYES EVERY 12 HOURS DAILY.   Rosuvastatin Calcium 40 MG Oral Tablet TAKE 1 TABLET DAILY.   Triamcinolone Acetonide 0.1 % External Cream APPLY  AND RUB  IN A THIN FILM TO AFFECTED AREAS TWICE DAILY.(AM AND PM).   Vitamin D3 50 MCG (2000 UT) Oral Capsule TAKE 1 CAPSULE Daily      Vitals  Vital Signs     Recorded: 14Oct2022 08:43AM   Heart Rate 86   Systolic 126   Diastolic 80   Height 5 ft 5 in   Weight 176 lb    BMI Calculated 29.29 kg/m2   BSA Calculated 1.87   Tobacco Use b) No   Falls Screening (Age 18+) a) No falls within the last year   O2 Saturation 97      Physical Exam

## 2024-08-26 ENCOUNTER — LAB (OUTPATIENT)
Dept: LAB | Facility: LAB | Age: 64
End: 2024-08-26
Payer: COMMERCIAL

## 2024-08-26 DIAGNOSIS — E11.9 TYPE 2 DIABETES MELLITUS WITHOUT COMPLICATION, WITHOUT LONG-TERM CURRENT USE OF INSULIN (MULTI): ICD-10-CM

## 2024-08-26 DIAGNOSIS — R74.8 ELEVATED ALKALINE PHOSPHATASE LEVEL: ICD-10-CM

## 2024-08-26 LAB — GGT SERPL-CCNC: 18 U/L (ref 5–55)

## 2024-08-26 PROCEDURE — 36415 COLL VENOUS BLD VENIPUNCTURE: CPT

## 2024-08-26 PROCEDURE — 83519 RIA NONANTIBODY: CPT

## 2024-08-26 PROCEDURE — 82977 ASSAY OF GGT: CPT

## 2024-08-29 LAB — GAD65 AB SER IA-ACNC: >250 IU/ML (ref 0–5)

## 2024-08-30 ENCOUNTER — TELEPHONE (OUTPATIENT)
Dept: PRIMARY CARE | Facility: CLINIC | Age: 64
End: 2024-08-30
Payer: COMMERCIAL

## 2024-09-03 ENCOUNTER — PATIENT MESSAGE (OUTPATIENT)
Dept: PRIMARY CARE | Facility: CLINIC | Age: 64
End: 2024-09-03
Payer: COMMERCIAL

## 2024-09-04 DIAGNOSIS — E11.9 TYPE 2 DIABETES MELLITUS WITHOUT COMPLICATION, WITHOUT LONG-TERM CURRENT USE OF INSULIN (MULTI): Primary | ICD-10-CM

## 2024-09-04 NOTE — TELEPHONE ENCOUNTER
Lvmtcb   Patient has a large autoimmune component to her diabetes. Recommend making sure she is still making insulin. Please get next set of blood work.   How are her blood sugars on average in the mornings now?

## 2024-09-05 ENCOUNTER — TELEPHONE (OUTPATIENT)
Dept: PRIMARY CARE | Facility: CLINIC | Age: 64
End: 2024-09-05
Payer: COMMERCIAL

## 2024-09-09 ENCOUNTER — LAB (OUTPATIENT)
Dept: LAB | Facility: LAB | Age: 64
End: 2024-09-09
Payer: COMMERCIAL

## 2024-09-09 DIAGNOSIS — E11.9 TYPE 2 DIABETES MELLITUS WITHOUT COMPLICATION, WITHOUT LONG-TERM CURRENT USE OF INSULIN (MULTI): Primary | ICD-10-CM

## 2024-09-09 DIAGNOSIS — E10.9 TYPE 1 DIABETES MELLITUS WITHOUT COMPLICATION (MULTI): ICD-10-CM

## 2024-09-09 DIAGNOSIS — E11.9 TYPE 2 DIABETES MELLITUS WITHOUT COMPLICATION, WITHOUT LONG-TERM CURRENT USE OF INSULIN (MULTI): ICD-10-CM

## 2024-09-09 LAB — C PEPTIDE SERPL-MCNC: 1.1 NG/ML (ref 0.7–3.9)

## 2024-09-09 PROCEDURE — 84681 ASSAY OF C-PEPTIDE: CPT

## 2024-09-09 PROCEDURE — 36415 COLL VENOUS BLD VENIPUNCTURE: CPT

## 2024-09-09 RX ORDER — INSULIN GLARGINE 100 [IU]/ML
10 INJECTION, SOLUTION SUBCUTANEOUS NIGHTLY
Qty: 3 ML | Refills: 3 | Status: SHIPPED | OUTPATIENT
Start: 2024-09-09 | End: 2025-09-09

## 2024-09-09 NOTE — PROGRESS NOTES
Discussed with dr villalta. Who will see the patient next week. Will initiate insulin . Attempted to call patient. Left message.

## 2024-09-09 NOTE — TELEPHONE ENCOUNTER
Spoke with patient. She wanted you to know that she has a 20 day average of 238 for her sugar. She did state that recently it has been getting better. Most recent was 176

## 2024-09-10 DIAGNOSIS — E11.9 TYPE 2 DIABETES MELLITUS WITHOUT COMPLICATION, WITHOUT LONG-TERM CURRENT USE OF INSULIN (MULTI): Primary | ICD-10-CM

## 2024-09-10 RX ORDER — PEN NEEDLE, DIABETIC 30 GX3/16"
1 NEEDLE, DISPOSABLE MISCELLANEOUS NIGHTLY
Qty: 90 EACH | Refills: 3 | Status: SHIPPED | OUTPATIENT
Start: 2024-09-10 | End: 2024-09-13 | Stop reason: SDUPTHER

## 2024-09-11 ENCOUNTER — APPOINTMENT (OUTPATIENT)
Dept: PRIMARY CARE | Facility: CLINIC | Age: 64
End: 2024-09-11
Payer: COMMERCIAL

## 2024-09-13 ENCOUNTER — OFFICE VISIT (OUTPATIENT)
Dept: PRIMARY CARE | Facility: CLINIC | Age: 64
End: 2024-09-13
Payer: COMMERCIAL

## 2024-09-13 VITALS
BODY MASS INDEX: 25.33 KG/M2 | OXYGEN SATURATION: 95 % | WEIGHT: 152 LBS | HEART RATE: 80 BPM | DIASTOLIC BLOOD PRESSURE: 68 MMHG | HEIGHT: 65 IN | SYSTOLIC BLOOD PRESSURE: 128 MMHG

## 2024-09-13 DIAGNOSIS — E10.69 TYPE 1 DIABETES MELLITUS WITH OTHER SPECIFIED COMPLICATION (MULTI): ICD-10-CM

## 2024-09-13 DIAGNOSIS — B37.9 YEAST INFECTION: Primary | ICD-10-CM

## 2024-09-13 DIAGNOSIS — E11.9 TYPE 2 DIABETES MELLITUS WITHOUT COMPLICATION, WITHOUT LONG-TERM CURRENT USE OF INSULIN (MULTI): ICD-10-CM

## 2024-09-13 DIAGNOSIS — F41.9 ANXIETY: ICD-10-CM

## 2024-09-13 LAB
NON-UH HIE APPEARANCE, U: CLEAR
NON-UH HIE BILIRUBIN, U: NEGATIVE
NON-UH HIE BLOOD, U: NEGATIVE
NON-UH HIE BUN/CREAT RATIO: 16.7
NON-UH HIE BUN: 10 MG/DL (ref 9–23)
NON-UH HIE CALCIUM: 10 MG/DL (ref 8.7–10.4)
NON-UH HIE CALCULATED OSMOLALITY: 282 MOSM/KG (ref 275–295)
NON-UH HIE CHLORIDE: 104 MMOL/L (ref 98–107)
NON-UH HIE CO2, VENOUS: 25 MMOL/L (ref 20–31)
NON-UH HIE COLOR, U: COLORLESS
NON-UH HIE CREATININE: 0.6 MG/DL (ref 0.5–0.8)
NON-UH HIE GFR AA: >60
NON-UH HIE GLOMERULAR FILTRATION RATE: >60 ML/MIN/1.73M?
NON-UH HIE GLUCOSE QUAL, U: NEGATIVE
NON-UH HIE GLUCOSE: 162 MG/DL (ref 74–106)
NON-UH HIE K: 3.8 MMOL/L (ref 3.5–5.1)
NON-UH HIE KETONES, U: NEGATIVE
NON-UH HIE LEUKOCYTE ESTERASE, U: NEGATIVE
NON-UH HIE NA: 140 MMOL/L (ref 135–145)
NON-UH HIE NITRITE, U: NEGATIVE
NON-UH HIE PH, U: 5.5 (ref 4.5–8)
NON-UH HIE PROTEIN, U: NEGATIVE
NON-UH HIE SPECIFIC GRAVITY, U: 1 (ref 1–1.03)
NON-UH HIE U MICRO: NORMAL
NON-UH HIE UROBILINOGEN QUAL, U: NORMAL

## 2024-09-13 PROCEDURE — 3008F BODY MASS INDEX DOCD: CPT | Performed by: INTERNAL MEDICINE

## 2024-09-13 PROCEDURE — 3074F SYST BP LT 130 MM HG: CPT | Performed by: INTERNAL MEDICINE

## 2024-09-13 PROCEDURE — 99214 OFFICE O/P EST MOD 30 MIN: CPT | Performed by: INTERNAL MEDICINE

## 2024-09-13 PROCEDURE — 1036F TOBACCO NON-USER: CPT | Performed by: INTERNAL MEDICINE

## 2024-09-13 PROCEDURE — 3078F DIAST BP <80 MM HG: CPT | Performed by: INTERNAL MEDICINE

## 2024-09-13 RX ORDER — INSULIN LISPRO 100 [IU]/ML
5 INJECTION, SOLUTION INTRAVENOUS; SUBCUTANEOUS
Qty: 15 ML | Refills: 3 | Status: SHIPPED | OUTPATIENT
Start: 2024-09-13

## 2024-09-13 RX ORDER — BLOOD-GLUCOSE SENSOR
EACH MISCELLANEOUS
Qty: 6 EACH | Refills: 3 | Status: SHIPPED | OUTPATIENT
Start: 2024-09-13

## 2024-09-13 RX ORDER — PEN NEEDLE, DIABETIC 30 GX3/16"
1 NEEDLE, DISPOSABLE MISCELLANEOUS NIGHTLY
Qty: 90 EACH | Refills: 3 | Status: SHIPPED | OUTPATIENT
Start: 2024-09-13

## 2024-09-13 RX ORDER — FLUCONAZOLE 150 MG/1
TABLET ORAL
Qty: 2 TABLET | Refills: 0 | Status: SHIPPED | OUTPATIENT
Start: 2024-09-13

## 2024-09-13 RX ORDER — BLOOD-GLUCOSE,RECEIVER,CONT
EACH MISCELLANEOUS
Qty: 1 EACH | Refills: 0 | Status: SHIPPED | OUTPATIENT
Start: 2024-09-13

## 2024-09-13 NOTE — LETTER
September 13, 2024     Patient: Naomi Willis   YOB: 1960   Date of Visit: 9/13/2024       To Whom It May Concern:    Naomi Willis was seen in my clinic on 9/13/2024 at 8:30 am. Please excuse Naomi for her absence from work on this day to make the appointment. And Please excuse naomi from work from September 16th until September 29th, with her return to work date being September 30th.     If you have any questions or concerns, please don't hesitate to call.         Sincerely,         Mk Sapp, DO        CC: Mk Sapp, DO

## 2024-09-13 NOTE — PROGRESS NOTES
"62 yo female here for the following    Chronic disease follow up    PHYSICAL October 16, 2023      Assessment/ plan:   Originally thought to be type 2 DM but with such high ARSEN and low c-peptide, low BMI she is likely acting more like a Type 1 DM:     A1C 5.6 --> 12.4 (8/8/24)  Blood sugar log reviewed shows sugars in the  morning sugars 200s in the -400s  Started on metformin 500mg bid 8/8/24 --> 1000mg bid 8/23/24  Glipizide ER 2.5mg daily  8/8/24 --> Glipizide 5mg ER 8/23/24--> stop glipizide 9/13/24  Lantus 10 units  Start humalog SSI    Blood sugar          0 units  151-200    2 units  201-250    4 units  251-300    6 units  301-350    8 units  351-400     10 units  Greater than 400 12 units call physician    Free style zoran two ordered    Bmp, ua ketones, abg     Anxiety: patient is feeling overwhelmed with her current circumstance    patient needs letter for employer to be off September 16-29 to go back to work on the 30 of September    Follow up in 3 months    Lung nodule follow up August 2025     Elevated alkaline  elevated abnormal weight loss 180s to 160s lbs in 1 month with temporal wasting on exam  Left lower back     8/2024 CT chest abdomen and pelvis with IV contrast No acute findings on ct  There are lung nodules that we will follow up in 1 year on  You have prominent ovarian veins identified left greater then right, recommend referral to GYN to discuss the finding.     Mammogram October 2024 wnl  Colonoscopy up to date  Patient follows with GYN regularly for pelvic exams    Other medical issues see below     hx melanoma in her left leg in the past seeing dermatology every 6 months     PAD bilateral ICA stenosis: patient to take baby aspirin, follow up with dr mathur      HLSEGUNDO: stable, continue statin to 40mg crestor      near syncope or \"light handedness\": on going light headed. getting a CTA neck and abdomen soon. her Holter is wnl. gave order for echo 1/28/21 was wnl, will repeat her " "blood work. follow up with dr mathur  carotid ultrasound originally showed   right ICA 40-59%  left ICA 60-79 %      HOWEVER she then had CTA neck which showed NO stenosis of the left ICA and mild stenosis of the right      Continue on aspirin and statin         hypothyroid: no symptoms at this time.         PATIENT says that she needs to get 3D Mammography as she has dense breast tissue         EKG is wnl 2023         following with GYN at [facility] Dr Senior     recommend shingles vaccine      Dr Martin colonoscopy screening June 2022 with follow up in 10 years.          History of Present Illness  female with hypertension, hypothyroidism comes for a      Patient is losing weight. She is fatigued. She says she cannot get enough water and she is drinking water all the time. Bruising easily too. Patients now found to have A1c 12. Her a1cs were always wnl. Patient has given me 10 days of her blood sugars with morning fasting sugars in the 200s and lung and dinner mostly in the 300-400 range. ARSEN Ab very elevated with little C-peptide production. Patient was started on insulin a few days ago. Her blood sugars are improving     Lower Back Pain: Intermittently for 2 years but has worsened over the past few months and has become more consistent. Left sided dull/achey lower back pain radiating to buttock and groin.  Pain improves with movement throughout the day but worse if she goes to bend over/ pick something up. Some relief with tylenol in the evening. No truama or accident. No falls. Feels worsening with sitting upright.     Other medical issues     near syncope or \"lightheaded\": she has to still follow up with dr mathur . she is using aspirin     PAD: patient has a carotid ultras sound with 40-59% on the CASSIE and 60-79 % left ICA, however, the CTA of neck in 2021 january that showed no stenosis of the left ICA and mild stenosis of the right side. she is following with dr mathur. she is on aspirin and statin therapy "      hypothyroid: patient denies any hypo or hypothyroid symptoms. patient denies any palpitations, diarrhea or constipation     HLD: Patient denies any muscle aches and is tolerating statin therapy     social: patient denies any smoking, drug or alcohol abuse     Family history is significant for premature coronary disease. Father had bypass surgery in his 50s.       surgical hx: removal of melanoma     patient does go to gyn     denies illegal drugs and smoking history   occasional alcohol use     patient works as a paper manager.          Review of Systems      Constitutional: not feeling tired and no fever, chills or sweats   Cardiovascular: no exertional chest pain, no palpitations,    Lungs: Denies shortness of breath, exertional dyspnea, wheezing  Gastrointestinal: no change in bowel habits, no diarrhea, no nausea,    Musculoskeletal: no myalgias, no muscle weakness and no limb swelling.   Skin: no rashes, no change in skin color and pigmentation and no skin lumps.   Neurological: no headaches, no seizures, no numbness, no lateralizing deficits and no fainting.   Psychiatric: no depression and no anxiety.   Urine: denies polyuria, hematuria, dysuria   Endocrine: no cold intolerance, no heat intolerance       Active Problems  Problems    · Allergic rhinitis due to pollen (477.0) (J30.1)   · Cellulitis of foot, left (682.7) (L03.116)   · Cough (786.2) (R05.9)   · Cracked skin (709.8) (R23.4)   · Encounter for immunization (V03.89) (Z23)   · Encounter for screening colonoscopy (V76.51) (Z12.11)   · Hospital discharge follow-up (V67.59) (Z09)   · Hyperlipidemia (272.4) (E78.5)   · Hypothyroidism (244.9) (E03.9)   · Added by Problem List Migration; 2013-12-13   · Lymphedema of left lower extremity (457.1) (I89.0)   · Malignant melanoma (172.9) (C43.9)   · Near syncope (780.2) (R55)   · PAD (peripheral artery disease) (443.9) (I73.9)   · Pressure in chest (786.59) (R07.89)   · Skin lesion (709.9) (L98.9)   · Right  scapular area. 1.5x1.5 cm slightly raised red irregularly bordered   · Vitamin D deficiency (268.9) (E55.9)     Past Medical History  Problems    · History of H/O colonoscopy (V45.89) (Z98.890)   01/15/10 dr choudhury     Social History  Problems    · Never smoker     Allergies  Medication    · No Known Drug Allergies   Recorded By: Mariya Lai; 7/2/2014 10:20:50 AM     Current Meds     Medication Name Instruction   Aspirin 81 MG Oral Tablet Delayed Release     Levothyroxine Sodium 100 MCG Oral Tablet TAKE 1 TABLET DAILY.   Restasis 0.05 % Ophthalmic Emulsion INSTILL 1 DROP IN BOTH EYES EVERY 12 HOURS DAILY.   Rosuvastatin Calcium 40 MG Oral Tablet TAKE 1 TABLET DAILY.   Triamcinolone Acetonide 0.1 % External Cream APPLY  AND RUB  IN A THIN FILM TO AFFECTED AREAS TWICE DAILY.(AM AND PM).   Vitamin D3 50 MCG (2000 UT) Oral Capsule TAKE 1 CAPSULE Daily      Vitals  Vital Signs     Recorded: [id number] 08:43AM   Heart Rate 86   Systolic 126   Diastolic 80   Height 5 ft 5 in   Weight 176 lb    BMI Calculated 29.29 kg/m2   BSA Calculated 1.87   Tobacco Use b) No   Falls Screening (Age 18+) a) No falls within the last year   O2 Saturation 97      Physical Exam     General appearance: Alert and in no acute distress. speech is clear and coherent  HEENT: Sclera and conjunctiva white, EOMI,     Respiratory : No respiratory distress, normal respiratory rhythm and effort. Clear bilateral breath sounds. No wheezing or rhonchi.   Cardiovascular: heart rate regular, S1, S2. no murmurs. no Lower extremity edema  Skin inspection: Normal skin color and pigmentation, normal skin turgor and no visible rash, induration, or cellulitis  MSK: 5/5 strength upper and lower extremities without gait abnormalities. no loss of muscle mass   Neuro: 2-12 CN grossly intact.  no slurred speech. no lateralizing deficit  Psychiatric Orientation: Oriented to person, place, and time. no depression, homicidal or suicidal thoughts, normal affect

## 2024-09-17 ENCOUNTER — HOSPITAL ENCOUNTER (OUTPATIENT)
Dept: RESPIRATORY THERAPY | Facility: HOSPITAL | Age: 64
Discharge: HOME | End: 2024-09-17
Payer: COMMERCIAL

## 2024-09-17 DIAGNOSIS — E11.9 TYPE 2 DIABETES MELLITUS WITHOUT COMPLICATION, WITHOUT LONG-TERM CURRENT USE OF INSULIN (MULTI): ICD-10-CM

## 2024-09-17 DIAGNOSIS — E11.9 TYPE 2 DIABETES MELLITUS WITHOUT COMPLICATION, WITHOUT LONG-TERM CURRENT USE OF INSULIN (MULTI): Primary | ICD-10-CM

## 2024-09-17 LAB
ARTERIAL PATENCY WRIST A: ABNORMAL
BASE EXCESS BLDA CALC-SCNC: 2.9 MMOL/L (ref -2–3)
BODY TEMPERATURE: 37 DEGREES CELSIUS
COHGB MFR BLDA: 1.6 %
DO-HGB MFR BLDA: 0.7 % (ref 0–5)
HCO3 BLDA-SCNC: 25.9 MMOL/L (ref 22–26)
HGB BLDA-MCNC: 14 G/DL (ref 12–16)
INHALED O2 CONCENTRATION: 21 %
METHGB MFR BLDA: 1 % (ref 0–1.5)
NON-UH HIE MISC SENDOUT: NORMAL
OXYHGB MFR BLDA: 96.7 % (ref 94–98)
PCO2 BLDA: 34 MM HG (ref 38–42)
PH BLDA: 7.49 PH (ref 7.38–7.42)
PO2 BLDA: 101 MM HG (ref 85–95)
SAO2 % BLDA: 99 % (ref 94–100)
SITE OF ARTERIAL PUNCTURE: ABNORMAL

## 2024-09-17 PROCEDURE — 36600 WITHDRAWAL OF ARTERIAL BLOOD: CPT

## 2024-09-17 PROCEDURE — 82375 ASSAY CARBOXYHB QUANT: CPT

## 2024-09-17 RX ORDER — FLASH GLUCOSE SCANNING READER
EACH MISCELLANEOUS
Qty: 1 EACH | Refills: 0 | Status: SHIPPED | OUTPATIENT
Start: 2024-09-17

## 2024-09-17 RX ORDER — FLASH GLUCOSE SENSOR
KIT MISCELLANEOUS
Qty: 1 EACH | Refills: 0 | Status: SHIPPED | OUTPATIENT
Start: 2024-09-17

## 2024-10-15 ENCOUNTER — TELEPHONE (OUTPATIENT)
Dept: PRIMARY CARE | Facility: CLINIC | Age: 64
End: 2024-10-15
Payer: COMMERCIAL

## 2024-10-15 NOTE — TELEPHONE ENCOUNTER
NEEDS TO KNOW IF SHE SHOULD FAST FOR APPT TOMORROW. NEW DIABETIC AN NUMBERS SEEM TO GO DOWN IN AM.

## 2024-10-16 ENCOUNTER — APPOINTMENT (OUTPATIENT)
Dept: PRIMARY CARE | Facility: CLINIC | Age: 64
End: 2024-10-16
Payer: COMMERCIAL

## 2024-10-16 VITALS
SYSTOLIC BLOOD PRESSURE: 118 MMHG | OXYGEN SATURATION: 98 % | DIASTOLIC BLOOD PRESSURE: 64 MMHG | BODY MASS INDEX: 24.83 KG/M2 | WEIGHT: 149 LBS | HEIGHT: 65 IN | HEART RATE: 75 BPM

## 2024-10-16 DIAGNOSIS — E13.69 OTHER SPECIFIED DIABETES MELLITUS WITH OTHER SPECIFIED COMPLICATION, UNSPECIFIED WHETHER LONG TERM INSULIN USE (MULTI): Primary | ICD-10-CM

## 2024-10-16 DIAGNOSIS — Z23 INFLUENZA VACCINATION ADMINISTERED AT CURRENT VISIT: ICD-10-CM

## 2024-10-16 DIAGNOSIS — C43.9 MALIGNANT MELANOMA, UNSPECIFIED SITE (MULTI): ICD-10-CM

## 2024-10-16 DIAGNOSIS — I73.9 PAD (PERIPHERAL ARTERY DISEASE) (CMS-HCC): ICD-10-CM

## 2024-10-16 LAB — POC HEMOGLOBIN A1C: 8.9 % (ref 4.2–6.5)

## 2024-10-16 PROCEDURE — 3078F DIAST BP <80 MM HG: CPT | Performed by: INTERNAL MEDICINE

## 2024-10-16 PROCEDURE — 3074F SYST BP LT 130 MM HG: CPT | Performed by: INTERNAL MEDICINE

## 2024-10-16 PROCEDURE — 90673 RIV3 VACCINE NO PRESERV IM: CPT | Performed by: INTERNAL MEDICINE

## 2024-10-16 PROCEDURE — 3008F BODY MASS INDEX DOCD: CPT | Performed by: INTERNAL MEDICINE

## 2024-10-16 PROCEDURE — 1036F TOBACCO NON-USER: CPT | Performed by: INTERNAL MEDICINE

## 2024-10-16 PROCEDURE — 90471 IMMUNIZATION ADMIN: CPT | Performed by: INTERNAL MEDICINE

## 2024-10-16 PROCEDURE — 99214 OFFICE O/P EST MOD 30 MIN: CPT | Performed by: INTERNAL MEDICINE

## 2024-10-16 PROCEDURE — 83036 HEMOGLOBIN GLYCOSYLATED A1C: CPT | Performed by: INTERNAL MEDICINE

## 2024-10-16 RX ORDER — INSULIN GLARGINE-YFGN 100 [IU]/ML
10 INJECTION, SOLUTION SUBCUTANEOUS NIGHTLY
COMMUNITY
Start: 2024-09-18

## 2024-10-16 NOTE — PROGRESS NOTES
"64 yo female here for the following     DM follow up    PHYSICAL October 16, 2023      Assessment/ plan:   Originally thought to be type 2 DM but with such high ARSEN and low c-peptide, low BMI she is likely acting more like a Type 1 DM:     A1C 5.6 --> 12.4 (8/8/24) --> 8.9 (10/16/24)  Blood sugar log reviewed shows sugars in the  morning sugars <140s prior to lunch and dinner are 100s. Patient count carbs  Started on metformin 500mg bid 8/8/24 --> 1000mg bid 8/23/24  Glipizide ER 2.5mg daily  8/8/24 --> Glipizide 5mg ER 8/23/24--> stop glipizide 9/13/24  Lantus 10 units  Continue on humalog SSI    Blood sugar          0 units  151-200    2 units  201-250    4 units  251-300    6 units  301-350    8 units  351-400     10 units  Greater than 400 12 units call physician    Free style zoran two ordered    Bmp, ua ketones, abg     Anxiety: stable    Lung nodule follow up August 2025     Elevated alkaline  elevated abnormal weight loss 180s to 160s lbs in 1 month with temporal wasting on exam  Left lower back     8/2024 CT chest abdomen and pelvis with IV contrast No acute findings on ct  There are lung nodules that we will follow up in 1 year on  You have prominent ovarian veins identified left greater then right, recommend referral to GYN to discuss the finding.     Mammogram October 2024 wnl  Colonoscopy up to date  Patient follows with GYN regularly for pelvic exams    Other medical issues see below     hx melanoma in her left leg in the past seeing dermatology every 6 months     PAD bilateral ICA stenosis: patient to take baby aspirin, follow up with dr mathur      HLD: stable, continue statin to 40mg crestor      near syncope or \"light handedness\": on going light headed. getting a CTA neck and abdomen soon. her Holter is wnl. gave order for echo 1/28/21 was wnl, will repeat her blood work. follow up with dr mathur  carotid ultrasound originally showed   right ICA 40-59%  left ICA 60-79 %      HOWEVER she then " "had CTA neck which showed NO stenosis of the left ICA and mild stenosis of the right      Continue on aspirin and statin         hypothyroid: no symptoms at this time.         PATIENT says that she needs to get 3D Mammography as she has dense breast tissue         EKG is wnl 2023         following with GYN at [facility] Dr Senior     recommend shingles vaccine      Dr Martin colonoscopy screening June 2022 with follow up in 10 years.          History of Present Illness  female with hypertension, hypothyroidism comes for a      Patient is losing weight. She is fatigued. She says she cannot get enough water and she is drinking water all the time. Bruising easily too. Patients now found to have A1c 12. Her a1cs were always wnl. Patient has given me 10 days of her blood sugars with morning fasting sugars in the 200s and lung and dinner mostly in the 300-400 range. ARSEN Ab very elevated with little C-peptide production. Patient was started on insulin a few days ago. Her blood sugars are improving     Lower Back Pain: Intermittently for 2 years but has worsened over the past few months and has become more consistent. Left sided dull/achey lower back pain radiating to buttock and groin.  Pain improves with movement throughout the day but worse if she goes to bend over/ pick something up. Some relief with tylenol in the evening. No truama or accident. No falls. Feels worsening with sitting upright.     Other medical issues     near syncope or \"lightheaded\": she has to still follow up with dr mathur . she is using aspirin     PAD: patient has a carotid ultras sound with 40-59% on the CASSIE and 60-79 % left ICA, however, the CTA of neck in 2021 january that showed no stenosis of the left ICA and mild stenosis of the right side. she is following with dr mathur. she is on aspirin and statin therapy      hypothyroid: patient denies any hypo or hypothyroid symptoms. patient denies any palpitations, diarrhea or constipation     HLD: " Patient denies any muscle aches and is tolerating statin therapy     social: patient denies any smoking, drug or alcohol abuse     Family history is significant for premature coronary disease. Father had bypass surgery in his 50s.       surgical hx: removal of melanoma     patient does go to gyn     denies illegal drugs and smoking history   occasional alcohol use     patient works as a paper manager.          Review of Systems      Constitutional: not feeling tired and no fever, chills or sweats   Cardiovascular: no exertional chest pain, no palpitations,    Lungs: Denies shortness of breath, exertional dyspnea, wheezing  Gastrointestinal: no change in bowel habits, no diarrhea, no nausea,      Neurological: no headaches, no seizures, no numbness, no lateralizing deficits and no fainting.   Psychiatric: no depression and no anxiety.   Urine: denies polyuria, hematuria, dysuria   Endocrine: no cold intolerance, no heat intolerance       Active Problems  Problems    · Allergic rhinitis due to pollen (477.0) (J30.1)   · Cellulitis of foot, left (682.7) (L03.116)   · Cough (786.2) (R05.9)   · Cracked skin (709.8) (R23.4)   · Encounter for immunization (V03.89) (Z23)   · Encounter for screening colonoscopy (V76.51) (Z12.11)   · Hospital discharge follow-up (V67.59) (Z09)   · Hyperlipidemia (272.4) (E78.5)   · Hypothyroidism (244.9) (E03.9)   · Added by Problem List Migration; 2013-12-13   · Lymphedema of left lower extremity (457.1) (I89.0)   · Malignant melanoma (172.9) (C43.9)   · Near syncope (780.2) (R55)   · PAD (peripheral artery disease) (443.9) (I73.9)   · Pressure in chest (786.59) (R07.89)   · Skin lesion (709.9) (L98.9)   · Right scapular area. 1.5x1.5 cm slightly raised red irregularly bordered   · Vitamin D deficiency (268.9) (E55.9)     Past Medical History  Problems    · History of H/O colonoscopy (V45.89) (Z98.890)   01/15/10 dr choudhury     Social History  Problems    · Never smoker      Allergies  Medication    · No Known Drug Allergies   Recorded By: Mariya Lai; 7/2/2014 10:20:50 AM     Current Meds     Medication Name Instruction   Aspirin 81 MG Oral Tablet Delayed Release     Levothyroxine Sodium 100 MCG Oral Tablet TAKE 1 TABLET DAILY.   Restasis 0.05 % Ophthalmic Emulsion INSTILL 1 DROP IN BOTH EYES EVERY 12 HOURS DAILY.   Rosuvastatin Calcium 40 MG Oral Tablet TAKE 1 TABLET DAILY.   Triamcinolone Acetonide 0.1 % External Cream APPLY  AND RUB  IN A THIN FILM TO AFFECTED AREAS TWICE DAILY.(AM AND PM).   Vitamin D3 50 MCG (2000 UT) Oral Capsule TAKE 1 CAPSULE Daily      Vitals  Vital Signs     Recorded: [id number] 08:43AM   Heart Rate 86   Systolic 126   Diastolic 80   Height 5 ft 5 in   Weight 176 lb    BMI Calculated 29.29 kg/m2   BSA Calculated 1.87   Tobacco Use b) No   Falls Screening (Age 18+) a) No falls within the last year   O2 Saturation 97      Physical Exam     General appearance: Alert and in no acute distress. speech is clear and coherent  HEENT: Sclera and conjunctiva white, EOMI,     Respiratory : No respiratory distress, normal respiratory rhythm and effort. Clear bilateral breath sounds. No wheezing or rhonchi.   Cardiovascular: heart rate regular, S1, S2. no murmurs. no Lower extremity edema  Skin inspection: Normal skin color and pigmentation, normal skin turgor and no visible rash, induration, or cellulitis  MSK: 5/5 strength upper and lower extremities without gait abnormalities. no loss of muscle mass   Neuro: 2-12 CN grossly intact.  no slurred speech. no lateralizing deficit  Psychiatric Orientation: Oriented to person, place, and time. no depression, homicidal or suicidal thoughts, normal affect

## 2024-10-30 ENCOUNTER — APPOINTMENT (OUTPATIENT)
Dept: PRIMARY CARE | Facility: CLINIC | Age: 64
End: 2024-10-30
Payer: COMMERCIAL

## 2024-10-30 VITALS
SYSTOLIC BLOOD PRESSURE: 118 MMHG | OXYGEN SATURATION: 96 % | DIASTOLIC BLOOD PRESSURE: 62 MMHG | WEIGHT: 149 LBS | BODY MASS INDEX: 24.83 KG/M2 | HEART RATE: 77 BPM | HEIGHT: 65 IN

## 2024-10-30 DIAGNOSIS — Z00.00 ANNUAL PHYSICAL EXAM: Primary | ICD-10-CM

## 2024-10-30 PROCEDURE — 99396 PREV VISIT EST AGE 40-64: CPT | Performed by: INTERNAL MEDICINE

## 2024-10-30 PROCEDURE — 3078F DIAST BP <80 MM HG: CPT | Performed by: INTERNAL MEDICINE

## 2024-10-30 PROCEDURE — 1036F TOBACCO NON-USER: CPT | Performed by: INTERNAL MEDICINE

## 2024-10-30 PROCEDURE — 3074F SYST BP LT 130 MM HG: CPT | Performed by: INTERNAL MEDICINE

## 2024-10-30 PROCEDURE — 93000 ELECTROCARDIOGRAM COMPLETE: CPT | Performed by: INTERNAL MEDICINE

## 2024-10-30 PROCEDURE — 3008F BODY MASS INDEX DOCD: CPT | Performed by: INTERNAL MEDICINE

## 2024-11-02 DIAGNOSIS — E78.5 HYPERLIPIDEMIA, UNSPECIFIED: ICD-10-CM

## 2024-11-04 RX ORDER — ROSUVASTATIN CALCIUM 20 MG/1
20 TABLET, COATED ORAL DAILY
Qty: 90 TABLET | Refills: 3 | Status: SHIPPED | OUTPATIENT
Start: 2024-11-04

## 2024-11-06 ENCOUNTER — LAB (OUTPATIENT)
Dept: LAB | Facility: LAB | Age: 64
End: 2024-11-06
Payer: COMMERCIAL

## 2024-11-06 DIAGNOSIS — Z00.00 ANNUAL PHYSICAL EXAM: ICD-10-CM

## 2024-11-06 LAB
25(OH)D3 SERPL-MCNC: 52 NG/ML (ref 30–100)
ALBUMIN SERPL BCP-MCNC: 4.1 G/DL (ref 3.4–5)
ALP SERPL-CCNC: 54 U/L (ref 33–136)
ALT SERPL W P-5'-P-CCNC: 25 U/L (ref 7–45)
ANION GAP SERPL CALC-SCNC: 10 MMOL/L (ref 10–20)
AST SERPL W P-5'-P-CCNC: 20 U/L (ref 9–39)
BILIRUB SERPL-MCNC: 0.5 MG/DL (ref 0–1.2)
BUN SERPL-MCNC: 15 MG/DL (ref 6–23)
CALCIUM SERPL-MCNC: 9.6 MG/DL (ref 8.6–10.6)
CHLORIDE SERPL-SCNC: 105 MMOL/L (ref 98–107)
CHOLEST SERPL-MCNC: 155 MG/DL (ref 0–199)
CHOLESTEROL/HDL RATIO: 2.4
CO2 SERPL-SCNC: 30 MMOL/L (ref 21–32)
CREAT SERPL-MCNC: 0.55 MG/DL (ref 0.5–1.05)
EGFRCR SERPLBLD CKD-EPI 2021: >90 ML/MIN/1.73M*2
ERYTHROCYTE [DISTWIDTH] IN BLOOD BY AUTOMATED COUNT: 11.5 % (ref 11.5–14.5)
GLUCOSE SERPL-MCNC: 121 MG/DL (ref 74–99)
HCT VFR BLD AUTO: 40.5 % (ref 36–46)
HDLC SERPL-MCNC: 65.8 MG/DL
HGB BLD-MCNC: 12.8 G/DL (ref 12–16)
LDLC SERPL CALC-MCNC: 81 MG/DL
MCH RBC QN AUTO: 30.8 PG (ref 26–34)
MCHC RBC AUTO-ENTMCNC: 31.6 G/DL (ref 32–36)
MCV RBC AUTO: 98 FL (ref 80–100)
NON HDL CHOLESTEROL: 89 MG/DL (ref 0–149)
NRBC BLD-RTO: 0 /100 WBCS (ref 0–0)
PLATELET # BLD AUTO: 348 X10*3/UL (ref 150–450)
POTASSIUM SERPL-SCNC: 4.6 MMOL/L (ref 3.5–5.3)
PROT SERPL-MCNC: 6.2 G/DL (ref 6.4–8.2)
RBC # BLD AUTO: 4.15 X10*6/UL (ref 4–5.2)
SODIUM SERPL-SCNC: 140 MMOL/L (ref 136–145)
TRIGL SERPL-MCNC: 43 MG/DL (ref 0–149)
TSH SERPL-ACNC: 0.91 MIU/L (ref 0.44–3.98)
VLDL: 9 MG/DL (ref 0–40)
WBC # BLD AUTO: 7 X10*3/UL (ref 4.4–11.3)

## 2024-11-06 PROCEDURE — 85027 COMPLETE CBC AUTOMATED: CPT

## 2024-11-06 PROCEDURE — 84443 ASSAY THYROID STIM HORMONE: CPT

## 2024-11-06 PROCEDURE — 82306 VITAMIN D 25 HYDROXY: CPT

## 2024-11-06 PROCEDURE — 80061 LIPID PANEL: CPT

## 2024-11-06 PROCEDURE — 36415 COLL VENOUS BLD VENIPUNCTURE: CPT

## 2024-11-06 PROCEDURE — 80053 COMPREHEN METABOLIC PANEL: CPT

## 2024-11-15 DIAGNOSIS — E11.9 TYPE 2 DIABETES MELLITUS WITHOUT COMPLICATION, WITHOUT LONG-TERM CURRENT USE OF INSULIN (MULTI): ICD-10-CM

## 2024-11-15 RX ORDER — PEN NEEDLE, DIABETIC 30 GX3/16"
1 NEEDLE, DISPOSABLE MISCELLANEOUS NIGHTLY
Qty: 300 EACH | Refills: 3 | Status: SHIPPED | OUTPATIENT
Start: 2024-11-15

## 2024-11-18 DIAGNOSIS — E11.9 TYPE 2 DIABETES MELLITUS WITHOUT COMPLICATION, WITHOUT LONG-TERM CURRENT USE OF INSULIN (MULTI): ICD-10-CM

## 2024-11-18 RX ORDER — PEN NEEDLE, DIABETIC 32GX 5/32"
NEEDLE, DISPOSABLE MISCELLANEOUS
Qty: 300 EACH | Refills: 3 | Status: SHIPPED | OUTPATIENT
Start: 2024-11-18 | End: 2024-11-19

## 2024-11-19 DIAGNOSIS — E11.9 TYPE 2 DIABETES MELLITUS WITHOUT COMPLICATION, WITHOUT LONG-TERM CURRENT USE OF INSULIN (MULTI): ICD-10-CM

## 2024-11-19 RX ORDER — PEN NEEDLE, DIABETIC 32GX 5/32"
NEEDLE, DISPOSABLE MISCELLANEOUS
Qty: 90 EACH | Refills: 3 | Status: SHIPPED | OUTPATIENT
Start: 2024-11-19 | End: 2024-11-19

## 2024-11-19 RX ORDER — PEN NEEDLE, DIABETIC 30 GX3/16"
NEEDLE, DISPOSABLE MISCELLANEOUS
Qty: 300 EACH | Refills: 3 | Status: SHIPPED | OUTPATIENT
Start: 2024-11-19

## 2024-12-04 ENCOUNTER — APPOINTMENT (OUTPATIENT)
Dept: PRIMARY CARE | Facility: CLINIC | Age: 64
End: 2024-12-04
Payer: COMMERCIAL

## 2024-12-10 DIAGNOSIS — E55.9 VITAMIN D DEFICIENCY, UNSPECIFIED: ICD-10-CM

## 2024-12-11 RX ORDER — ACETAMINOPHEN 500 MG
TABLET ORAL DAILY
Qty: 90 CAPSULE | Refills: 3 | Status: SHIPPED | OUTPATIENT
Start: 2024-12-11

## 2024-12-14 LAB
NON-UH HIE A/G RATIO: 1.4
NON-UH HIE ALB: 4 G/DL (ref 3.4–5)
NON-UH HIE ALK PHOS: 73 UNIT/L (ref 45–117)
NON-UH HIE BILIRUBIN, TOTAL: 0.6 MG/DL (ref 0.3–1.2)
NON-UH HIE BUN/CREAT RATIO: 25.7
NON-UH HIE BUN: 18 MG/DL (ref 9–23)
NON-UH HIE CALCIUM: 10.2 MG/DL (ref 8.7–10.4)
NON-UH HIE CALCULATED LDL CHOLESTEROL: 89 MG/DL (ref 60–130)
NON-UH HIE CALCULATED OSMOLALITY: 281 MOSM/KG (ref 275–295)
NON-UH HIE CHLORIDE: 108 MMOL/L (ref 98–107)
NON-UH HIE CHOLESTEROL: 182 MG/DL (ref 100–200)
NON-UH HIE CO2, VENOUS: 29 MMOL/L (ref 20–31)
NON-UH HIE CREATININE, URINE MG/DL: 11.6 MG/DL
NON-UH HIE CREATININE: 0.7 MG/DL (ref 0.5–0.8)
NON-UH HIE GFR AA: >60
NON-UH HIE GLOBULIN: 2.8 G/DL
NON-UH HIE GLOMERULAR FILTRATION RATE: >60 ML/MIN/1.73M?
NON-UH HIE GLUCOSE: 88 MG/DL (ref 74–106)
NON-UH HIE GOT: 29 UNIT/L (ref 15–37)
NON-UH HIE GPT: 39 UNIT/L (ref 10–49)
NON-UH HIE HDL CHOLESTEROL: 84 MG/DL (ref 40–60)
NON-UH HIE HGB A1C: 7 %
NON-UH HIE K: 4.2 MMOL/L (ref 3.5–5.1)
NON-UH HIE MICROALBUMIN, URINE MG/L: <3 MG/L
NON-UH HIE MICROALBUMIN/CREATININE RATIO: <26 MG MALB/GM CREAT (ref 0–30)
NON-UH HIE NA: 140 MMOL/L (ref 135–145)
NON-UH HIE TOTAL CHOL/HDL CHOL RATIO: 2.2
NON-UH HIE TOTAL PROTEIN: 6.8 G/DL (ref 5.7–8.2)
NON-UH HIE TRIGLYCERIDES: 43 MG/DL (ref 30–150)
NON-UH HIE TSH: 2.96 UIU/ML (ref 0.55–4.78)

## 2024-12-19 LAB — NON-UH HIE MISC SENDOUT: NORMAL

## 2025-02-05 ENCOUNTER — APPOINTMENT (OUTPATIENT)
Dept: VASCULAR SURGERY | Facility: CLINIC | Age: 65
End: 2025-02-05
Payer: COMMERCIAL

## 2025-02-05 VITALS
OXYGEN SATURATION: 96 % | HEART RATE: 71 BPM | SYSTOLIC BLOOD PRESSURE: 128 MMHG | DIASTOLIC BLOOD PRESSURE: 74 MMHG | BODY MASS INDEX: 23.32 KG/M2 | HEIGHT: 65 IN | WEIGHT: 140 LBS

## 2025-02-05 DIAGNOSIS — I65.23 BILATERAL CAROTID ARTERY STENOSIS: ICD-10-CM

## 2025-02-05 DIAGNOSIS — I73.9 PAD (PERIPHERAL ARTERY DISEASE) (CMS-HCC): ICD-10-CM

## 2025-02-05 PROCEDURE — 99204 OFFICE O/P NEW MOD 45 MIN: CPT | Performed by: SURGERY

## 2025-02-05 PROCEDURE — 3074F SYST BP LT 130 MM HG: CPT | Performed by: SURGERY

## 2025-02-05 PROCEDURE — 3078F DIAST BP <80 MM HG: CPT | Performed by: SURGERY

## 2025-02-05 PROCEDURE — 3008F BODY MASS INDEX DOCD: CPT | Performed by: SURGERY

## 2025-02-09 PROBLEM — I65.23 BILATERAL CAROTID ARTERY STENOSIS: Status: ACTIVE | Noted: 2025-02-09

## 2025-02-09 NOTE — PROGRESS NOTES
"Beth Willis is a 64 y.o. female     Subjective   This patient presents today as a new consult for evaluation of carotid artery disease.  She denies any constitutional symptoms associated with her carotid artery disease.  She is currently 64 years old.  She denies any fever chills nausea vomiting or headache.  She has never smoked.  She is 5 foot 5 and weighs 140 pounds.  She does have a medical history of peripheral arterial disease and type 2 diabetes.  She also has hyperlipidemia.  She is currently on aspirin and 20 mg of rosuvastatin and other medications.         Objective     Vitals:    02/05/25 0900   BP: 128/74   Pulse: 71   SpO2: 96%      Physical Exam  This patient is alert and oriented x3 her head is normocephalic neck is soft and supple without palpable lymph nodes.  Heart is regular rate.  Lungs are clear.  Abdomen soft with positive bowel sounds.  There is no pain on palpation.  Upper and lower extremities have adequate range of motion with palpable brachial radial femoral and popliteal pulses.  Skin turgor is adequate.  Psychologically the patient appears to be acting appropriately.  Blood pressure 128/74, pulse 71, height 1.651 m (5' 5\"), weight 63.5 kg (140 lb), SpO2 96%.            Patient Active Problem List    Diagnosis Date Noted    Malignant melanoma, unspecified site (Multi) 10/16/2024    Lung nodule 08/23/2024    Other specified diabetes mellitus with other specified complication 08/23/2024    Type 2 diabetes mellitus without complication, without long-term current use of insulin (Multi) 08/23/2024    Annual physical exam 10/16/2023    Hyperlipidemia 10/16/2023    PAD (peripheral artery disease) (CMS-[facility]) 10/16/2023    Chronic left-sided low back pain without sciatica 09/18/2023    Hypothyroidism 04/19/2023          Current Outpatient Medications:     aspirin 81 mg chewable tablet, Chew 1 tablet (81 mg) once daily., Disp: , Rfl:     blood sugar diagnostic (OneTouch Ultra Test) strip, Test " "twice daily, Disp: 100 each, Rfl: 1    blood-glucose meter misc, Use as directed, Disp: 3 each, Rfl: 1    cholecalciferol (Vitamin D-3) 50 mcg (2,000 unit) capsule, TAKE 1 CAPSULE BY MOUTH EVERY DAY, Disp: 90 capsule, Rfl: 3    fluconazole (Diflucan) 150 mg tablet, 1 tablet now and again in 72 hours (Patient not taking: Reported on 10/30/2024), Disp: 2 tablet, Rfl: 0    FreeStyle Juan Carlos 2 [place] misc, Use as instructed (Patient not taking: Reported on 10/30/2024), Disp: 1 each, Rfl: 0    FreeStyle Juan Carlos 2 Sensor kit, Use as instructed (Patient not taking: Reported on 10/30/2024), Disp: 1 each, Rfl: 0    FreeStyle Juan Carlos 3 [place] misc, Use as instructed, Disp: 1 each, Rfl: 0    FreeStyle Juan Carlos 3 Sensor device, Use as directed, Disp: 6 each, Rfl: 3    insulin glargine (Lantus Solostar U-100 Insulin) 100 unit/mL (3 mL) pen, Inject 10 Units under the skin once daily at bedtime. Take as directed per insulin instructions. (Patient not taking: Reported on 10/30/2024), Disp: 3 mL, Rfl: 3    insulin lispro (HumaLOG KwikPen Insulin) 100 unit/mL injection, Inject 5 Units under the skin 3 times daily (morning, midday, late afternoon). Take as directed per insulin instructions., Disp: 15 mL, Rfl: 3    lancets misc, Test twice daily, Disp: 100 each, Rfl: 3    levothyroxine (Synthroid, Levoxyl) 100 mcg tablet, TAKE 1 TABLET BY MOUTH EVERY DAY, Disp: 90 tablet, Rfl: 3    metFORMIN (Glucophage) 1,000 mg tablet, Take 1 tablet (1,000 mg) by mouth 2 times daily (morning and late afternoon). (Patient not taking: Reported on 10/30/2024), Disp: 180 tablet, Rfl: 3    pen needle, diabetic (BD Dana 2nd Gen Pen Needle) 32 gauge x 5/32\" needle, INJECT INSULIN 3 TIMES DAILY, Disp: 300 each, Rfl: 3    Restasis 0.05 % ophthalmic emulsion, Administer 1 drop into both eyes 2 times a day., Disp: , Rfl:     rosuvastatin (Crestor) 20 mg tablet, TAKE 1 TABLET BY MOUTH EVERY DAY, Disp: 90 tablet, Rfl: 3    Semglee,insulin glarg-yfgn,Pen 100 unit/mL (3 mL) " Pen, Inject 10 Units under the skin once daily at bedtime., Disp: , Rfl:      Lab Results   Component Value Date    WBC 7.0 11/06/2024    HGB 12.8 11/06/2024    HCT 40.5 11/06/2024     11/06/2024    CHOL 155 11/06/2024    TRIG 43 11/06/2024    HDL 65.8 11/06/2024    ALT 25 11/06/2024    AST 20 11/06/2024     11/06/2024    K 4.6 11/06/2024     11/06/2024    CREATININE 0.55 11/06/2024    BUN 15 11/06/2024    CO2 30 11/06/2024    TSH 0.91 11/06/2024    HGBA1C 8.9 (A) 10/16/2024       No results found.              Assessment/Plan   Assessment & Plan  PAD (peripheral artery disease) (CMS-[facility])    Bilateral carotid artery stenosis      This patient presents today as a new consult for evaluation of carotid artery disease.  She currently denies any constitutional symptoms associated with her carotid artery disease.  Her last carotid duplex scan was in November 2020.  This showed mild disease on the right and moderate disease on the left.  At this time, I would like her to follow-up in 6 months with a repeat carotid duplex scan.  I am encouraging her to exercise on a consistent basis.  Her carotid disease is  worse on the left compared to her previous carotid duplex scan.  Thank you very much for allow me to take part in the care of your patient.  Sincerely years, Dr. Neftaly Smyth, DO

## 2025-02-22 DIAGNOSIS — E03.9 HYPOTHYROIDISM, UNSPECIFIED: ICD-10-CM

## 2025-02-24 RX ORDER — LEVOTHYROXINE SODIUM 100 UG/1
TABLET ORAL
Qty: 90 TABLET | Refills: 3 | Status: SHIPPED | OUTPATIENT
Start: 2025-02-24

## 2025-04-07 ENCOUNTER — TELEMEDICINE (OUTPATIENT)
Dept: PRIMARY CARE | Facility: CLINIC | Age: 65
End: 2025-04-07
Payer: COMMERCIAL

## 2025-04-07 VITALS — WEIGHT: 135 LBS | BODY MASS INDEX: 21.69 KG/M2 | HEIGHT: 66 IN

## 2025-04-07 DIAGNOSIS — C43.9 MALIGNANT MELANOMA, UNSPECIFIED SITE (MULTI): ICD-10-CM

## 2025-04-07 DIAGNOSIS — E13.69 OTHER SPECIFIED DIABETES MELLITUS WITH OTHER SPECIFIED COMPLICATION, UNSPECIFIED WHETHER LONG TERM INSULIN USE (MULTI): ICD-10-CM

## 2025-04-07 DIAGNOSIS — G89.29 CHRONIC LEFT-SIDED LOW BACK PAIN WITHOUT SCIATICA: ICD-10-CM

## 2025-04-07 DIAGNOSIS — R05.9 COUGH, UNSPECIFIED TYPE: Primary | ICD-10-CM

## 2025-04-07 DIAGNOSIS — A49.9 BACTERIAL INFECTION: ICD-10-CM

## 2025-04-07 DIAGNOSIS — E11.9 TYPE 2 DIABETES MELLITUS WITHOUT COMPLICATION, WITHOUT LONG-TERM CURRENT USE OF INSULIN: ICD-10-CM

## 2025-04-07 DIAGNOSIS — E78.5 HYPERLIPIDEMIA, UNSPECIFIED HYPERLIPIDEMIA TYPE: ICD-10-CM

## 2025-04-07 DIAGNOSIS — I73.9 PAD (PERIPHERAL ARTERY DISEASE) (CMS-HCC): ICD-10-CM

## 2025-04-07 DIAGNOSIS — M54.50 CHRONIC LEFT-SIDED LOW BACK PAIN WITHOUT SCIATICA: ICD-10-CM

## 2025-04-07 DIAGNOSIS — E10.69 TYPE 1 DIABETES MELLITUS WITH OTHER SPECIFIED COMPLICATION: ICD-10-CM

## 2025-04-07 PROCEDURE — 1036F TOBACCO NON-USER: CPT | Performed by: EMERGENCY MEDICINE

## 2025-04-07 PROCEDURE — 3008F BODY MASS INDEX DOCD: CPT | Performed by: EMERGENCY MEDICINE

## 2025-04-07 PROCEDURE — 99213 OFFICE O/P EST LOW 20 MIN: CPT | Performed by: EMERGENCY MEDICINE

## 2025-04-07 RX ORDER — AZITHROMYCIN 250 MG/1
250 TABLET, FILM COATED ORAL DAILY
Qty: 6 TABLET | Refills: 0 | Status: SHIPPED | OUTPATIENT
Start: 2025-04-07 | End: 2025-04-12

## 2025-04-07 NOTE — PROGRESS NOTES
Subjective   Patient ID: Beth Willis is a 65 y.o. female who presents for Nasal Congestion and Cough.    Assessment/Plan   Problem List Items Addressed This Visit       Type 1 diabetes mellitus with other specified complication    Malignant melanoma, unspecified site (Multi)     Other Visit Diagnoses       Other specified diabetes mellitus with other specified complication, unspecified whether long term insulin use (Multi)              Sinusitis/[id number]    Diabetes-on insulin Lantus and Humalog and metformin.    Hypothyroidism-continue levothyroxine    Hyperlipidemia-controlled with Crestor    Vitamin D deficiency-supplementation    Follow-up as needed      Source of history: Nurse, Medical personnel, Medical record, Patient.  History limitation: None.    HPI   This visit was completed virtually due to the restrictions of the COVID-19 pandemic. All issues as below were discussed and addressed but no physical exam was performed. If it was felt that the patient should be evaluated in clinic or ER, then they were directed there. The patient verbally consented to visit    Runny nose coughing sore throat for the past few days  No chest pain shortness of breath or leg pain or swelling  No Known Allergies    Current Outpatient Medications   Medication Sig Dispense Refill    aspirin 81 mg chewable tablet Chew 1 tablet (81 mg) once daily.      blood sugar diagnostic (OneTouch Ultra Test) strip Test twice daily 100 each 1    blood-glucose meter misc Use as directed 3 each 1    cholecalciferol (Vitamin D-3) 50 mcg (2,000 unit) capsule TAKE 1 CAPSULE BY MOUTH EVERY DAY 90 capsule 3    FreeStyle Juan Carlos 3 [employer] misc Use as instructed 1 each 0    FreeStyle Juan Carlos 3 Sensor device Use as directed 6 each 3    insulin lispro (HumaLOG KwikPen Insulin) 100 unit/mL injection Inject 5 Units under the skin 3 times daily (morning, midday, late afternoon). Take as directed per insulin instructions. (Patient taking differently:  "Inject under the skin. Per sliding scale) 15 mL 3    lancets misc Test twice daily 100 each 3    levothyroxine (Synthroid, Levoxyl) 100 mcg tablet TAKE 1 TABLET BY MOUTH EVERY DAY 90 tablet 3    pen needle, diabetic (BD Dana 2nd Gen Pen Needle) 32 gauge x 5/32\" needle INJECT INSULIN 3 TIMES DAILY 300 each 3    Restasis 0.05 % ophthalmic emulsion Administer 1 drop into both eyes 2 times a day.      rosuvastatin (Crestor) 20 mg tablet TAKE 1 TABLET BY MOUTH EVERY DAY 90 tablet 3    Semglee,insulin glarg-yfgn,Pen 100 unit/mL (3 mL) Pen Inject 10 Units under the skin once daily at bedtime.      fluconazole (Diflucan) 150 mg tablet 1 tablet now and again in 72 hours (Patient not taking: Reported on 10/16/2024) 2 tablet 0    FreeStyle Juan Carlos 2 [place] misc Use as instructed (Patient not taking: Reported on 10/16/2024) 1 each 0    FreeStyle Juan Carlos 2 Sensor kit Use as instructed (Patient not taking: Reported on 10/16/2024) 1 each 0    insulin glargine (Lantus Solostar U-100 Insulin) 100 unit/mL (3 mL) pen Inject 10 Units under the skin once daily at bedtime. Take as directed per insulin instructions. (Patient not taking: Reported on 10/16/2024) 3 mL 3    metFORMIN (Glucophage) 1,000 mg tablet Take 1 tablet (1,000 mg) by mouth 2 times daily (morning and late afternoon). (Patient not taking: Reported on 10/16/2024) 180 tablet 3     No current facility-administered medications for this visit.       Objective   Visit Vitals  Ht 1.664 m (5' 5.5\")   Wt 61.2 kg (135 lb)   BMI 22.12 kg/m²   Smoking Status Never   BSA 1.68 m²     Physical Exam  Vital signs as per nursing/MA documentation  General appearance: Alert and in no acute distress  HEENT: Normal Inspection  Neck - Normal Inspection  Respiratory : No respiratory distress. Lungs are clear   Cardiovascular: heart rate normal. No gallop  Back - normal inspection  Skin inspection:Warm  Musculoskeletal : No deformities  Neuro : Limited exam. Baseline    Review of Systems   " Comprehensive review of systems as allowed by patient condition and nursing input is negative    Results including lab work, imaging reports were reviewed and wherever possible, independently verified    No family history on file.  Social History     Socioeconomic History    Marital status:    Tobacco Use    Smoking status: Never    Smokeless tobacco: Never   Substance and Sexual Activity    Alcohol use: Not Currently     Alcohol/week: 3.0 standard drinks of alcohol     Types: 3 Glasses of wine per week     Comment: periodically    Drug use: Never     Past Medical History:   Diagnosis Date    Other specified postprocedural states     H/O colonoscopy     History reviewed. No pertinent surgical history.    Charting was completed using voice recognition technology and may include unintended errors.

## 2025-07-17 ENCOUNTER — HOSPITAL ENCOUNTER (OUTPATIENT)
Dept: VASCULAR MEDICINE | Facility: HOSPITAL | Age: 65
Discharge: HOME | End: 2025-07-17
Payer: COMMERCIAL

## 2025-07-17 DIAGNOSIS — I65.23 BILATERAL CAROTID ARTERY STENOSIS: ICD-10-CM

## 2025-07-17 PROCEDURE — 93880 EXTRACRANIAL BILAT STUDY: CPT

## 2025-07-17 PROCEDURE — 93880 EXTRACRANIAL BILAT STUDY: CPT | Performed by: SURGERY

## 2025-08-06 ENCOUNTER — APPOINTMENT (OUTPATIENT)
Dept: VASCULAR SURGERY | Facility: CLINIC | Age: 65
End: 2025-08-06
Payer: COMMERCIAL

## 2025-08-06 VITALS
BODY MASS INDEX: 21.33 KG/M2 | OXYGEN SATURATION: 100 % | SYSTOLIC BLOOD PRESSURE: 118 MMHG | DIASTOLIC BLOOD PRESSURE: 72 MMHG | HEART RATE: 74 BPM | HEIGHT: 65 IN | WEIGHT: 128 LBS

## 2025-08-06 DIAGNOSIS — I65.23 BILATERAL CAROTID ARTERY STENOSIS: Primary | ICD-10-CM

## 2025-08-06 PROCEDURE — 3074F SYST BP LT 130 MM HG: CPT | Performed by: SURGERY

## 2025-08-06 PROCEDURE — 99213 OFFICE O/P EST LOW 20 MIN: CPT | Performed by: SURGERY

## 2025-08-06 PROCEDURE — 3078F DIAST BP <80 MM HG: CPT | Performed by: SURGERY

## 2025-08-06 PROCEDURE — 1036F TOBACCO NON-USER: CPT | Performed by: SURGERY

## 2025-08-06 PROCEDURE — 3008F BODY MASS INDEX DOCD: CPT | Performed by: SURGERY

## 2025-08-06 NOTE — PROGRESS NOTES
"Beth Willis is a 65 y.o. female     Subjective   This patient presents today for follow-up of her carotid artery disease.  She currently denies any constitutional symptoms associated with her carotid artery disease.  She has never smoked.  She is 5 foot 5 and weighs 128 pounds.  She does exercise on a consistent basis.  Apparently, she was recently diagnosed with type 1 diabetes.  This is very unusual.  She denies any fever chills nausea vomiting or headache.  She does have a glucose monitor now.  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  She did receive COVID shots in the past.         Objective     Vitals:    08/06/25 0700   BP: 118/72   Pulse: 74   SpO2: 100%      Physical Exam  This patient is alert and oriented x3 her head is normocephalic neck is soft and supple without palpable lymph nodes.  Heart is regular rate.  Lungs are clear.  Abdomen soft with positive bowel sounds.  There is no pain on palpation.  Upper and lower extremities have adequate range of motion with palpable brachial radial femoral and popliteal pulses.  Skin turgor is adequate.  Psychologically the patient appears to be acting appropriately.  Blood pressure 118/72, pulse 74, height 1.651 m (5' 5\"), weight 58.1 kg (128 lb), SpO2 100%.            Patient Active Problem List    Diagnosis Date Noted    Bilateral carotid artery stenosis 02/09/2025    Malignant melanoma, unspecified site (Multi) 10/16/2024    Lung nodule 08/23/2024    Type 1 diabetes mellitus with other specified complication 08/23/2024    Type 2 diabetes mellitus without complication, without long-term current use of insulin 08/23/2024    Annual physical exam 10/16/2023    Hyperlipidemia 10/16/2023    PAD (peripheral artery disease) 10/16/2023    Chronic left-sided low back pain without sciatica 09/18/2023    Hypothyroidism 04/19/2023        Current Medications[1]     Lab Results   Component Value Date    WBC 7.0 11/06/2024    HGB 12.8 11/06/2024 "    HCT 40.5 11/06/2024     11/06/2024    CHOL 155 11/06/2024    TRIG 43 11/06/2024    HDL 65.8 11/06/2024    ALT 25 11/06/2024    AST 20 11/06/2024     11/06/2024    K 4.6 11/06/2024     11/06/2024    CREATININE 0.55 11/06/2024    BUN 15 11/06/2024    CO2 30 11/06/2024    TSH 0.91 11/06/2024    HGBA1C 8.9 (A) 10/16/2024       Vascular US Carotid Artery Duplex Bilateral  Result Date: 7/20/2025           [place] [address] Tel [phone] and Fax [phone]  [facility]  Patient Name:      LANCE RAYMOND JONNA       Reading Physician:  [id number] Mely Brown MD Study Date:        7/17/2025           Ordering Physician: [id number] TERESA MARIA MRN/PID:           [id number]            Technologist:       Mini Le T Accession#:        [id number]        Technologist 2: Date of Birth/Age: 1960 / 65      Encounter#:         [id number]                    years Gender:            F Admission Status:  Outpatient          Location Performed: [facility]  Diagnosis/ICD: Occlusion and stenosis of bilateral carotid arteries-I65.23 CPT Codes:     97148 Cerebrovascular Carotid Duplex scan complete  CONCLUSIONS: Right Carotid: Findings are consistent with less than 50% stenosis of the right proximal internal carotid artery. Laminar flow seen by color Doppler. Right external carotid artery appears patent with no evidence of stenosis. No evidence of hemodynamically significant stenosis of the right common carotid artery. The right vertebral artery is patent with antegrade flow. No evidence of hemodynamically significant stenosis in the right subclavian artery. Left Carotid: Findings are consistent with less than 50% stenosis of the left proximal internal carotid artery. Laminar flow seen by color Doppler. Left external carotid artery appears patent with no  evidence of stenosis. No evidence of hemodynamically significant stenosis of the left common carotid artery. The left vertebral artery is patent with antegrade flow. No evidence of hemodynamically significant stenosis in the left subclavian artery.  Imaging & Doppler Findings: Right Plaque Morph: The proximal right internal carotid artery demonstrates heterogenous and intimal thickening plaque. The distal right common carotid artery demonstrates intimal thickening plaque. Left Plaque Morph: The proximal left internal carotid artery demonstrates intimal thickening and calcified plaque. The distal left common carotid artery demonstrates intimal thickening plaque.   Right                        Left   PSV      EDV                PSV       cm/s           CCA P    135 cm/s 89 cm/s            CCA D    94 cm/s 134 cm/s 41 cm/s   ICA P    110 cm/s 37 cm/s 146 cm/s 45 cm/s   ICA M    112 cm/s 28 cm/s 112 cm/s 23 cm/s   ICA D    128 cm/s 43 cm/s 125 cm/s            ECA     114 cm/s 54 cm/s  9 cm/s  Vertebral  76 cm/s  17 cm/s 168 cm/s         Subclavian 135 cm/s               Right Left ICA/CCA Ratio  1.5  1.2   [id number] Mely Brown MD Electronically signed by [id number] Mely Brown MD on 7/20/2025 at 1:56:45 PM  ** Final **                 Assessment/Plan   Assessment & Plan      This patient presents today for follow-up of her carotid artery disease.  She currently denies any constitutional symptoms associated with her carotid artery disease.  She recently had a carotid duplex scan done and I have extensively gone over the results with her.  This shows mild to slightly moderate disease on the right.  Her peak systolic velocities of 146 cm/s with end-diastolic velocity of 45 cm/s.  On the left, her velocities are normal.  At this time, she can follow-up in 18 months with a repeat carotid duplex scan.  If that looks good then we can go every 2 or 3 years.      Neftaly Smyth,         [1]   Current Outpatient Medications:      "aspirin 81 mg chewable tablet, Chew 1 tablet (81 mg) once daily., Disp: , Rfl:     blood sugar diagnostic (OneTouch Ultra Test) strip, Test twice daily, Disp: 100 each, Rfl: 1    blood-glucose meter misc, Use as directed, Disp: 3 each, Rfl: 1    cholecalciferol (Vitamin D-3) 50 mcg (2,000 unit) capsule, TAKE 1 CAPSULE BY MOUTH EVERY DAY, Disp: 90 capsule, Rfl: 3    fluconazole (Diflucan) 150 mg tablet, 1 tablet now and again in 72 hours (Patient not taking: Reported on 10/16/2024), Disp: 2 tablet, Rfl: 0    FreeStyle [place] 2 [place] misc, Use as instructed (Patient not taking: Reported on 10/16/2024), Disp: 1 each, Rfl: 0    FreeStyle Juan Carlos 2 Sensor kit, Use as instructed (Patient not taking: Reported on 10/16/2024), Disp: 1 each, Rfl: 0    FreeStyle Juan [place] 3 [place] misc, Use as instructed, Disp: 1 each, Rfl: 0    FreeStyle Juan Carlos 3 Sensor device, Use as directed, Disp: 6 each, Rfl: 3    insulin glargine (Lantus Solostar U-100 Insulin) 100 unit/mL (3 mL) pen, Inject 10 Units under the skin once daily at bedtime. Take as directed per insulin instructions. (Patient not taking: Reported on 10/16/2024), Disp: 3 mL, Rfl: 3    insulin lispro (HumaLOG KwikPen Insulin) 100 unit/mL injection, Inject 5 Units under the skin 3 times daily (morning, midday, late afternoon). Take as directed per insulin instructions. (Patient taking differently: Inject under the skin. Per sliding scale), Disp: 15 mL, Rfl: 3    lancets misc, Test twice daily, Disp: 100 each, Rfl: 3    levothyroxine (Synthroid, Levoxyl) 100 mcg tablet, TAKE 1 TABLET BY MOUTH EVERY DAY, Disp: 90 tablet, Rfl: 3    metFORMIN (Glucophage) 1,000 mg tablet, Take 1 tablet (1,000 mg) by mouth 2 times daily (morning and late afternoon). (Patient not taking: Reported on 10/16/2024), Disp: 180 tablet, Rfl: 3    pen needle, diabetic (BD Dana 2nd Gen Pen Needle) 32 gauge x 5/32\" needle, INJECT INSULIN 3 TIMES DAILY, Disp: 300 each, Rfl: 3    Restasis 0.05 % ophthalmic emulsion, " Administer 1 drop into both eyes 2 times a day., Disp: , Rfl:     rosuvastatin (Crestor) 20 mg tablet, TAKE 1 TABLET BY MOUTH EVERY DAY, Disp: 90 tablet, Rfl: 3    Semglee,insulin glarg-yfgn,Pen 100 unit/mL (3 mL) Pen, Inject 10 Units under the skin once daily at bedtime., Disp: , Rfl:

## 2025-11-07 ENCOUNTER — APPOINTMENT (OUTPATIENT)
Dept: PRIMARY CARE | Facility: CLINIC | Age: 65
End: 2025-11-07
Payer: COMMERCIAL